# Patient Record
Sex: FEMALE | Race: WHITE | NOT HISPANIC OR LATINO | Employment: OTHER | URBAN - NONMETROPOLITAN AREA
[De-identification: names, ages, dates, MRNs, and addresses within clinical notes are randomized per-mention and may not be internally consistent; named-entity substitution may affect disease eponyms.]

---

## 2017-02-07 ENCOUNTER — HOSPITAL ENCOUNTER (EMERGENCY)
Facility: HOSPITAL | Age: 27
Discharge: HOME | End: 2017-02-07
Admitting: NURSE PRACTITIONER

## 2017-02-07 VITALS
TEMPERATURE: 98 F | WEIGHT: 209.8 LBS | BODY MASS INDEX: 34.95 KG/M2 | SYSTOLIC BLOOD PRESSURE: 153 MMHG | RESPIRATION RATE: 18 BRPM | OXYGEN SATURATION: 98 % | DIASTOLIC BLOOD PRESSURE: 83 MMHG | HEIGHT: 65 IN | HEART RATE: 98 BPM

## 2017-02-07 DIAGNOSIS — L02.91 ABSCESS: Primary | ICD-10-CM

## 2017-02-07 PROCEDURE — 99282 EMERGENCY DEPT VISIT SF MDM: CPT

## 2017-02-07 RX ORDER — SULFAMETHOXAZOLE AND TRIMETHOPRIM 800; 160 MG/1; MG/1
1 TABLET ORAL 2 TIMES DAILY
Qty: 14 TABLET | Refills: 0 | Status: SHIPPED | OUTPATIENT
Start: 2017-02-07 | End: 2017-02-14

## 2017-02-07 RX ORDER — HYDROCODONE BITARTRATE AND ACETAMINOPHEN 5; 325 MG/1; MG/1
1 TABLET ORAL EVERY 6 HOURS PRN
Qty: 12 TABLET | Refills: 0 | Status: SHIPPED | OUTPATIENT
Start: 2017-02-07 | End: 2017-02-10

## 2017-02-08 NOTE — DISCHARGE INSTRUCTIONS
Please follow-up to primary care provider in one to 2 days for recheck.  Return to ER as needed sooner if any new or worsening symptoms. Use warm soaks to the affected area; f/u tomorrow with your PCP for a recheck or return here for recheck if you cannot be seen

## 2017-02-08 NOTE — ED NOTES
"PT HAS 2 \"BUMPS\" ON LEFT BUTTOCK THAT ARE PAINFUL AND SWOLLEN.      Yeimi Green RN  02/07/17 3424    "

## 2017-02-11 NOTE — ED PROVIDER NOTES
Subjective   Patient is a 26 y.o. female presenting with abscess.   History provided by:  Patient   used: No    Abscess   Abscess location: right buttock.  Abscess quality: induration, painful, redness and warmth    Abscess quality: not draining, no fluctuance, no itching and not weeping    Red streaking: no    Duration:  2 days  Progression:  Worsening  Pain details:     Quality:  Pressure    Severity:  Moderate    Duration:  2 days    Timing:  Constant    Progression:  Worsening  Chronicity:  New  Context: not diabetes, not immunosuppression, not insect bite/sting and not skin injury    Relieved by:  Nothing  Worsened by:  Nothing  Ineffective treatments:  None tried  Associated symptoms: no anorexia, no fatigue, no fever, no headaches, no nausea and no vomiting    Risk factors: no family hx of MRSA, no hx of MRSA and no prior abscess        Review of Systems   Constitutional: Negative for fatigue and fever.   Gastrointestinal: Negative for anorexia, nausea and vomiting.   Neurological: Negative for headaches.   All other systems reviewed and are negative.      Past Medical History   Diagnosis Date   • Anxiety    • Bipolar 1 disorder    • Bipolar 1 disorder    • Depression        No Known Allergies    Past Surgical History   Procedure Laterality Date   •  section     • Cholecystectomy         History reviewed. No pertinent family history.    Social History     Social History   • Marital status:      Spouse name: N/A   • Number of children: N/A   • Years of education: N/A     Social History Main Topics   • Smoking status: Current Every Day Smoker     Packs/day: 1.00     Types: Cigarettes   • Smokeless tobacco: None   • Alcohol use No   • Drug use: No   • Sexual activity: Not Asked     Other Topics Concern   • None     Social History Narrative   • None           Objective   Physical Exam   Constitutional: She is oriented to person, place, and time. She appears well-developed and  well-nourished.   HENT:   Head: Normocephalic and atraumatic.   Eyes: Conjunctivae are normal. Pupils are equal, round, and reactive to light.   Cardiovascular: Normal rate, regular rhythm and normal heart sounds.    Pulmonary/Chest: Effort normal and breath sounds normal.   Musculoskeletal: Normal range of motion.   Neurological: She is alert and oriented to person, place, and time.   Skin: Skin is warm and dry.        Nursing note and vitals reviewed.      Procedures         ED Course  ED Course   Comment By Time   At this time, the area is not read to be I&D, there is erythema noted, but now fluctuance or pocket of infection noted on exam. Pt will be placed on ABX at this time; she is advised to use warm compresses and warm soaks to the affected area. She is UTD on her TD vaccine at this time. She is asked to f/u with her PCP in 1-2 days for a recheck. R/t to ER as needed, sooner if any new or worsening symptoms. Radha Villafana, APRN 02/11 1346                  MDM  Number of Diagnoses or Management Options  Abscess: new and requires workup  Patient Progress  Patient progress: stable      Final diagnoses:   Abscess            Radha Villafana, APRN  02/11/17 3476

## 2017-07-28 ENCOUNTER — TRANSCRIBE ORDERS (OUTPATIENT)
Dept: GENERAL RADIOLOGY | Facility: HOSPITAL | Age: 27
End: 2017-07-28

## 2017-07-28 ENCOUNTER — LAB (OUTPATIENT)
Dept: LAB | Facility: HOSPITAL | Age: 27
End: 2017-07-28

## 2017-07-28 DIAGNOSIS — Z79.899 DRUG THERAPY: ICD-10-CM

## 2017-07-28 DIAGNOSIS — Z79.899 DRUG THERAPY: Primary | ICD-10-CM

## 2017-07-28 LAB
ALBUMIN SERPL-MCNC: 3.8 G/DL (ref 3.5–5)
ALBUMIN/GLOB SERPL: 1.1 G/DL (ref 1.1–2.5)
ALP SERPL-CCNC: 52 U/L (ref 24–120)
ALT SERPL W P-5'-P-CCNC: 34 U/L (ref 0–54)
AMPHET+METHAMPHET UR QL: POSITIVE
ANION GAP SERPL CALCULATED.3IONS-SCNC: 8 MMOL/L (ref 4–13)
AST SERPL-CCNC: 23 U/L (ref 7–45)
AUTO MIXED CELLS #: 0.5 10*3/MM3 (ref 0.1–2.6)
AUTO MIXED CELLS %: 8 % (ref 0.1–24)
BARBITURATES UR QL SCN: NEGATIVE
BENZODIAZ UR QL SCN: POSITIVE
BILIRUB SERPL-MCNC: 0.6 MG/DL (ref 0.1–1)
BUN BLD-MCNC: 9 MG/DL (ref 5–21)
BUN/CREAT SERPL: 13.6
CALCIUM SPEC-SCNC: 8.8 MG/DL (ref 8.4–10.4)
CANNABINOIDS SERPL QL: NEGATIVE
CHLORIDE SERPL-SCNC: 104 MMOL/L (ref 98–110)
CO2 SERPL-SCNC: 28 MMOL/L (ref 24–31)
COCAINE UR QL: NEGATIVE
CREAT BLD-MCNC: 0.66 MG/DL (ref 0.5–1.4)
ERYTHROCYTE [DISTWIDTH] IN BLOOD BY AUTOMATED COUNT: 12.8 % (ref 12–15)
GFR SERPL CREATININE-BSD FRML MDRD: 107 ML/MIN/1.73
GLOBULIN UR ELPH-MCNC: 3.6 GM/DL
GLUCOSE BLD-MCNC: 96 MG/DL (ref 70–100)
HCT VFR BLD AUTO: 39.2 % (ref 37–47)
HGB BLD-MCNC: 13.9 G/DL (ref 12–16)
LYMPHOCYTES # BLD AUTO: 3.8 10*3/MM3 (ref 0.8–7)
LYMPHOCYTES NFR BLD AUTO: 56.8 % (ref 15–45)
MCH RBC QN AUTO: 30.3 PG (ref 28–32)
MCHC RBC AUTO-ENTMCNC: 35.5 G/DL (ref 33–36)
MCV RBC AUTO: 85.6 FL (ref 82–98)
METHADONE UR QL SCN: NEGATIVE
NEUTROPHILS # BLD AUTO: 2.4 10*3/MM3 (ref 1.5–8.3)
NEUTROPHILS NFR BLD AUTO: 35.2 % (ref 39–78)
OPIATES UR QL: NEGATIVE
PCP UR QL SCN: NEGATIVE
PLATELET # BLD AUTO: 248 10*3/MM3 (ref 130–400)
PMV BLD AUTO: 8.5 FL (ref 6–12)
POTASSIUM BLD-SCNC: 4.2 MMOL/L (ref 3.5–5.3)
PROT SERPL-MCNC: 7.4 G/DL (ref 6.3–8.7)
RBC # BLD AUTO: 4.58 10*6/MM3 (ref 4.2–5.4)
SODIUM BLD-SCNC: 140 MMOL/L (ref 135–145)
VALPROATE SERPL-MCNC: 26.7 MCG/ML (ref 50–100)
WBC NRBC COR # BLD: 6.7 10*3/MM3 (ref 4.8–10.8)

## 2017-07-28 PROCEDURE — 80307 DRUG TEST PRSMV CHEM ANLYZR: CPT | Performed by: NURSE PRACTITIONER

## 2017-07-28 PROCEDURE — 85025 COMPLETE CBC W/AUTO DIFF WBC: CPT

## 2017-07-28 PROCEDURE — 80053 COMPREHEN METABOLIC PANEL: CPT

## 2017-07-28 PROCEDURE — 80164 ASSAY DIPROPYLACETIC ACD TOT: CPT | Performed by: NURSE PRACTITIONER

## 2017-07-28 PROCEDURE — 36415 COLL VENOUS BLD VENIPUNCTURE: CPT

## 2017-08-14 ENCOUNTER — APPOINTMENT (OUTPATIENT)
Dept: GENERAL RADIOLOGY | Facility: HOSPITAL | Age: 27
End: 2017-08-14

## 2017-08-14 ENCOUNTER — HOSPITAL ENCOUNTER (EMERGENCY)
Facility: HOSPITAL | Age: 27
Discharge: HOME | End: 2017-08-14
Attending: EMERGENCY MEDICINE | Admitting: EMERGENCY MEDICINE

## 2017-08-14 VITALS
HEART RATE: 99 BPM | WEIGHT: 180 LBS | SYSTOLIC BLOOD PRESSURE: 129 MMHG | BODY MASS INDEX: 29.99 KG/M2 | TEMPERATURE: 98.4 F | RESPIRATION RATE: 14 BRPM | DIASTOLIC BLOOD PRESSURE: 67 MMHG | OXYGEN SATURATION: 100 % | HEIGHT: 65 IN

## 2017-08-14 DIAGNOSIS — R07.81 PLEURITIC CHEST PAIN: Primary | ICD-10-CM

## 2017-08-14 LAB
ALBUMIN SERPL-MCNC: 4.5 G/DL (ref 3.5–5)
ALBUMIN/GLOB SERPL: 1.3 G/DL (ref 1.1–2.5)
ALP SERPL-CCNC: 63 U/L (ref 24–120)
ALT SERPL W P-5'-P-CCNC: 42 U/L (ref 0–54)
ANION GAP SERPL CALCULATED.3IONS-SCNC: 8 MMOL/L (ref 4–13)
AST SERPL-CCNC: 39 U/L (ref 7–45)
B-HCG UR QL: NEGATIVE
BACTERIA UR QL AUTO: ABNORMAL /HPF
BASOPHILS # BLD AUTO: 0.01 10*3/MM3 (ref 0–0.2)
BASOPHILS NFR BLD AUTO: 0.1 % (ref 0–2)
BILIRUB SERPL-MCNC: 0.5 MG/DL (ref 0.1–1)
BILIRUB UR QL STRIP: NEGATIVE
BUN BLD-MCNC: 11 MG/DL (ref 5–21)
BUN/CREAT SERPL: 17.2 (ref 7–25)
CALCIUM SPEC-SCNC: 9.2 MG/DL (ref 8.4–10.4)
CHLORIDE SERPL-SCNC: 103 MMOL/L (ref 98–110)
CLARITY UR: CLEAR
CO2 SERPL-SCNC: 26 MMOL/L (ref 24–31)
COLOR UR: YELLOW
CREAT BLD-MCNC: 0.64 MG/DL (ref 0.5–1.4)
D DIMER PPP FEU-MCNC: 0.69 MG/L (FEU) (ref 0–0.5)
DEPRECATED RDW RBC AUTO: 41.9 FL (ref 40–54)
EOSINOPHIL # BLD AUTO: 0.04 10*3/MM3 (ref 0–0.7)
EOSINOPHIL NFR BLD AUTO: 0.5 % (ref 0–4)
ERYTHROCYTE [DISTWIDTH] IN BLOOD BY AUTOMATED COUNT: 13.1 % (ref 12–15)
GFR SERPL CREATININE-BSD FRML MDRD: 111 ML/MIN/1.73
GLOBULIN UR ELPH-MCNC: 3.5 GM/DL
GLUCOSE BLD-MCNC: 87 MG/DL (ref 70–100)
GLUCOSE UR STRIP-MCNC: NEGATIVE MG/DL
HCT VFR BLD AUTO: 42.5 % (ref 37–47)
HGB BLD-MCNC: 14.8 G/DL (ref 12–16)
HGB UR QL STRIP.AUTO: NEGATIVE
HYALINE CASTS UR QL AUTO: ABNORMAL /LPF
IMM GRANULOCYTES # BLD: 0.01 10*3/MM3 (ref 0–0.03)
IMM GRANULOCYTES NFR BLD: 0.1 % (ref 0–5)
KETONES UR QL STRIP: NEGATIVE
LEUKOCYTE ESTERASE UR QL STRIP.AUTO: ABNORMAL
LYMPHOCYTES # BLD AUTO: 1.87 10*3/MM3 (ref 0.72–4.86)
LYMPHOCYTES NFR BLD AUTO: 23.8 % (ref 15–45)
MCH RBC QN AUTO: 30.4 PG (ref 28–32)
MCHC RBC AUTO-ENTMCNC: 34.8 G/DL (ref 33–36)
MCV RBC AUTO: 87.3 FL (ref 82–98)
MONOCYTES # BLD AUTO: 0.73 10*3/MM3 (ref 0.19–1.3)
MONOCYTES NFR BLD AUTO: 9.3 % (ref 4–12)
NEUTROPHILS # BLD AUTO: 5.2 10*3/MM3 (ref 1.87–8.4)
NEUTROPHILS NFR BLD AUTO: 66.2 % (ref 39–78)
NITRITE UR QL STRIP: NEGATIVE
PH UR STRIP.AUTO: 6.5 [PH] (ref 5–8)
PLATELET # BLD AUTO: 191 10*3/MM3 (ref 130–400)
PMV BLD AUTO: 10.6 FL (ref 6–12)
POTASSIUM BLD-SCNC: 4.3 MMOL/L (ref 3.5–5.3)
PROT SERPL-MCNC: 8 G/DL (ref 6.3–8.7)
PROT UR QL STRIP: NEGATIVE
RBC # BLD AUTO: 4.87 10*6/MM3 (ref 4.2–5.4)
RBC # UR: ABNORMAL /HPF
REF LAB TEST METHOD: ABNORMAL
SODIUM BLD-SCNC: 137 MMOL/L (ref 135–145)
SP GR UR STRIP: 1.02 (ref 1–1.03)
SQUAMOUS #/AREA URNS HPF: ABNORMAL /HPF
UROBILINOGEN UR QL STRIP: ABNORMAL
WBC NRBC COR # BLD: 7.86 10*3/MM3 (ref 4.8–10.8)
WBC UR QL AUTO: ABNORMAL /HPF

## 2017-08-14 PROCEDURE — 85025 COMPLETE CBC W/AUTO DIFF WBC: CPT | Performed by: EMERGENCY MEDICINE

## 2017-08-14 PROCEDURE — 81001 URINALYSIS AUTO W/SCOPE: CPT | Performed by: EMERGENCY MEDICINE

## 2017-08-14 PROCEDURE — 71020 HC CHEST PA AND LATERAL: CPT

## 2017-08-14 PROCEDURE — 99283 EMERGENCY DEPT VISIT LOW MDM: CPT

## 2017-08-14 PROCEDURE — 96372 THER/PROPH/DIAG INJ SC/IM: CPT

## 2017-08-14 PROCEDURE — 81025 URINE PREGNANCY TEST: CPT | Performed by: EMERGENCY MEDICINE

## 2017-08-14 PROCEDURE — 25010000002 KETOROLAC TROMETHAMINE PER 15 MG: Performed by: EMERGENCY MEDICINE

## 2017-08-14 PROCEDURE — 85379 FIBRIN DEGRADATION QUANT: CPT | Performed by: EMERGENCY MEDICINE

## 2017-08-14 PROCEDURE — 80053 COMPREHEN METABOLIC PANEL: CPT | Performed by: EMERGENCY MEDICINE

## 2017-08-14 RX ORDER — KETOROLAC TROMETHAMINE 10 MG/1
10 TABLET, FILM COATED ORAL EVERY 6 HOURS PRN
Qty: 12 TABLET | Refills: 0 | Status: SHIPPED | OUTPATIENT
Start: 2017-08-14 | End: 2022-09-08

## 2017-08-14 RX ORDER — KETOROLAC TROMETHAMINE 30 MG/ML
30 INJECTION, SOLUTION INTRAMUSCULAR; INTRAVENOUS ONCE
Status: DISCONTINUED | OUTPATIENT
Start: 2017-08-14 | End: 2017-08-14

## 2017-08-14 RX ORDER — GABAPENTIN 800 MG/1
800 TABLET ORAL 3 TIMES DAILY
COMMUNITY
End: 2022-09-08

## 2017-08-14 RX ORDER — CYCLOBENZAPRINE HCL 10 MG
10 TABLET ORAL ONCE
Status: COMPLETED | OUTPATIENT
Start: 2017-08-14 | End: 2017-08-14

## 2017-08-14 RX ORDER — KETOROLAC TROMETHAMINE 30 MG/ML
30 INJECTION, SOLUTION INTRAMUSCULAR; INTRAVENOUS ONCE
Status: COMPLETED | OUTPATIENT
Start: 2017-08-14 | End: 2017-08-14

## 2017-08-14 RX ORDER — CYCLOBENZAPRINE HCL 10 MG
10 TABLET ORAL 3 TIMES DAILY PRN
Qty: 20 TABLET | Refills: 0 | Status: SHIPPED | OUTPATIENT
Start: 2017-08-14 | End: 2022-09-08

## 2017-08-14 RX ADMIN — KETOROLAC TROMETHAMINE 30 MG: 30 INJECTION, SOLUTION INTRAMUSCULAR at 21:22

## 2017-08-14 RX ADMIN — CYCLOBENZAPRINE HYDROCHLORIDE 10 MG: 10 TABLET, FILM COATED ORAL at 21:21

## 2017-08-15 NOTE — ED NOTES
Patient reports that for the past 2 days she has had abdominal discomfort and cannot eat anything because it feels so full and feels like the food is lodged in her chest. Patient has been taking gas x which has made her belch but causes pains in her chest when she does belch     Yeimi Green RN  08/14/17 2042

## 2017-08-15 NOTE — ED PROVIDER NOTES
Subjective   HPI Comments: Pt c/o a dull ache in her chest.  States that it began 2 days ago.  Pain worse with movement and deep inspiration.  She has not had a cough with the discomfort.  Pt reports a fever but has not taken the temp.  She states that she took tylenol PTA and has been sweating since.  She reports feeling like there is something stuck in her epigastric area that is causing the discomfort.        History provided by:  Patient      Review of Systems   Constitutional: Negative for activity change, fatigue and fever.   HENT: Negative for congestion, ear pain, facial swelling, postnasal drip, rhinorrhea, sinus pressure, sore throat and trouble swallowing.    Eyes: Negative for photophobia and redness.   Respiratory: Positive for chest tightness. Negative for shortness of breath and wheezing.    Cardiovascular: Positive for chest pain. Negative for palpitations and leg swelling.   Gastrointestinal: Positive for abdominal pain. Negative for abdominal distention, diarrhea, nausea and vomiting.   Genitourinary: Negative for difficulty urinating, dysuria and flank pain.   Musculoskeletal: Negative for back pain and neck pain.   Skin: Negative for color change and wound.   Neurological: Negative for dizziness, speech difficulty, weakness, light-headedness and headaches.   Psychiatric/Behavioral: Negative for agitation, confusion, self-injury and suicidal ideas.       Past Medical History:   Diagnosis Date   • Anxiety    • Bipolar 1 disorder    • Bipolar 1 disorder    • Depression        No Known Allergies    Past Surgical History:   Procedure Laterality Date   •  SECTION     • CHOLECYSTECTOMY         History reviewed. No pertinent family history.    Social History     Social History   • Marital status:      Spouse name: N/A   • Number of children: N/A   • Years of education: N/A     Social History Main Topics   • Smoking status: Current Every Day Smoker     Packs/day: 1.00     Types: Cigarettes    • Smokeless tobacco: None   • Alcohol use No   • Drug use: No   • Sexual activity: Not Asked     Other Topics Concern   • None     Social History Narrative           Objective   Physical Exam   Constitutional: She is oriented to person, place, and time. She appears well-developed and well-nourished. No distress.   HENT:   Head: Normocephalic and atraumatic.   Mouth/Throat: Oropharynx is clear and moist. No oropharyngeal exudate.   Eyes: EOM are normal. Pupils are equal, round, and reactive to light.   Neck: Normal range of motion. Neck supple. No JVD present.   Cardiovascular: Normal rate, regular rhythm and normal heart sounds.  Exam reveals no friction rub.    No murmur heard.  Pulmonary/Chest: Effort normal and breath sounds normal. No respiratory distress. She has no wheezes. She has no rales.   Abdominal: Soft. Bowel sounds are normal. She exhibits no distension. There is no tenderness. There is no rebound and no guarding.   Neurological: She is alert and oriented to person, place, and time. No cranial nerve deficit.   Skin: Skin is warm and dry. No rash noted.   Psychiatric: She has a normal mood and affect. Her behavior is normal. Judgment and thought content normal.   Nursing note and vitals reviewed.      Procedures         ED Course  ED Course      Lab Results (last 24 hours)     Procedure Component Value Units Date/Time    Urinalysis With / Culture If Indicated [[id number]]  (Abnormal) Collected:  08/14/17 2107    Specimen:  Urine from Urine, Clean Catch Updated:  08/14/17 2119     Color, UA Yellow     Appearance, UA Clear     pH, UA 6.5     Specific Gravity, UA 1.025     Glucose, UA Negative     Ketones, UA Negative     Bilirubin, UA Negative     Blood, UA Negative     Protein, UA Negative     Leuk Esterase, UA Trace (A)     Nitrite, UA Negative     Urobilinogen, UA 0.2 E.U./dL    Pregnancy, Urine [[id number]]  (Normal) Collected:  08/14/17 2107    Specimen:  Urine from Urine, Clean Catch Updated:   08/14/17 2120     HCG, Urine QL Negative    Urinalysis, Microscopic Only [[id number]]  (Abnormal) Collected:  08/14/17 2107    Specimen:  Urine from Urine, Clean Catch Updated:  08/14/17 2119     RBC, UA 3-5 (A) /HPF      WBC, UA 3-5 (A) /HPF      Bacteria, UA None Seen /HPF      Squamous Epithelial Cells, UA 3-6 (A) /HPF      Hyaline Casts, UA 0-2 /LPF      Methodology Automated Microscopy    CBC & Differential [[id number]] Collected:  08/14/17 2140    Specimen:  Blood Updated:  08/14/17 2149    Narrative:       The following orders were created for panel order CBC & Differential.  Procedure                               Abnormality         Status                     ---------                               -----------         ------                     CBC Auto Differential[[id number]]        Normal              Final result                 Please view results for these tests on the individual orders.    Comprehensive Metabolic Panel [[id number]] Collected:  08/14/17 2140    Specimen:  Blood Updated:  08/14/17 2158     Glucose 87 mg/dL      BUN 11 mg/dL      Creatinine 0.64 mg/dL      Sodium 137 mmol/L      Potassium 4.3 mmol/L      Chloride 103 mmol/L      CO2 26.0 mmol/L      Calcium 9.2 mg/dL      Total Protein 8.0 g/dL      Albumin 4.50 g/dL      ALT (SGPT) 42 U/L      AST (SGOT) 39 U/L      Alkaline Phosphatase 63 U/L      Total Bilirubin 0.5 mg/dL      eGFR Non African Amer 111 mL/min/1.73      Globulin 3.5 gm/dL      A/G Ratio 1.3 g/dL      BUN/Creatinine Ratio 17.2     Anion Gap 8.0 mmol/L     D-dimer, Quantitative [[id number]]  (Abnormal) Collected:  08/14/17 2140    Specimen:  Blood Updated:  08/14/17 2200     D-Dimer, Quantitative 0.69 (H) mg/L (FEU)     Narrative:       Reference Range is 0-0.50 mg/L FEU. However, results <0.50 mg/L FEU tends to rule out DVT or PE. Results >0.50 mg/L FEU are not useful in predicting absence or presence of DVT or PE.    CBC Auto Differential [[id number]]  (Normal) Collected:   08/14/17 2140    Specimen:  Blood Updated:  08/14/17 2149     WBC 7.86 10*3/mm3      RBC 4.87 10*6/mm3      Hemoglobin 14.8 g/dL      Hematocrit 42.5 %      MCV 87.3 fL      MCH 30.4 pg      MCHC 34.8 g/dL      RDW 13.1 %      RDW-SD 41.9 fl      MPV 10.6 fL      Platelets 191 10*3/mm3      Neutrophil % 66.2 %      Lymphocyte % 23.8 %      Monocyte % 9.3 %      Eosinophil % 0.5 %      Basophil % 0.1 %      Immature Grans % 0.1 %      Neutrophils, Absolute 5.20 10*3/mm3      Lymphocytes, Absolute 1.87 10*3/mm3      Monocytes, Absolute 0.73 10*3/mm3      Eosinophils, Absolute 0.04 10*3/mm3      Basophils, Absolute 0.01 10*3/mm3      Immature Grans, Absolute 0.01 10*3/mm3                       MDM  Number of Diagnoses or Management Options  Pleuritic chest pain: new and requires workup  Diagnosis management comments: Pt denied cough to me but the nurse reports that pt has been coughing green sputum.  Her w/u is essentially normal.  Pt was told that she could not go out to smoke yet pt walked out stating that she needed to call her .  I suspect that pt has bronchitis after the nurse and I discussed the case, but pt was not in the room when I went to d/c her.  The dimer was up 0.1 above normal and I do not suspect that she has a PE.  Her Well's criteria score was a 0.  Will place her on NSAIDs and muscle relaxer.  F/u with PCP.  Pt instructed to return to the ED if they has any further issues or new complaints.          Amount and/or Complexity of Data Reviewed  Clinical lab tests: ordered and reviewed  Tests in the radiology section of CPT®: ordered and reviewed  Tests in the medicine section of CPT®: ordered and reviewed  Independent visualization of images, tracings, or specimens: yes    Risk of Complications, Morbidity, and/or Mortality  Presenting problems: moderate  Diagnostic procedures: moderate  Management options: moderate    Patient Progress  Patient progress: stable      Final diagnoses:   Pleuritic  chest pain            Odin Briones MD  08/14/17 5352

## 2017-08-18 NOTE — PROGRESS NOTES
Continued Stay Note   Jacob     Patient Name: Emmie Noonan  MRN: [id number]  Today's Date: 8/18/2017    Admit Date: 8/14/2017          Discharge Plan       08/18/17 1429    Case Management/Social Work Plan    Additional Comments Pt needs established MD. Appt made at [facility] on [address] on Aug 30th @ 3pm.  Left message on pt's  [phone].               Discharge Codes     None            JAVY Parks

## 2017-08-18 NOTE — ED NOTES
"ED Call Back Questions    1. How are you doing since leaving the Emergency Department?    Feel the same.  2. Do you have any questions about your discharge instructions? No     3. Have you filled your new prescriptions yet? Yes   a. Do you have any questions about those medications? No     4. Were you able to make a follow-up appointment with the physician? No     5. Do you have a primary care physician? No   a. If No, would you like for me to set you up with one? Yes   i. If Yes, “I will have our ED  give you a call right back at this number to work with you on the best time for an appointment.”    6. We are always looking to get better at what we do. Do you have any suggestions for what we can do to be even better? No   a. If Yes, \"Thank you for sharing your concerns. I apologize. I will follow up with our manager and patient . Would you like someone to call you back?\" Yes     7. Is there anything else I can do for you? No   Visit was ok,just took forever to get called back     Glen Geller  08/18/17 1020    "

## 2017-11-30 ENCOUNTER — TRANSCRIBE ORDERS (OUTPATIENT)
Dept: ADMINISTRATIVE | Facility: HOSPITAL | Age: 27
End: 2017-11-30

## 2017-11-30 DIAGNOSIS — Z12.31 ENCOUNTER FOR SCREENING MAMMOGRAM FOR MALIGNANT NEOPLASM OF BREAST: Primary | ICD-10-CM

## 2017-12-01 ENCOUNTER — HOSPITAL ENCOUNTER (OUTPATIENT)
Dept: ULTRASOUND IMAGING | Facility: HOSPITAL | Age: 27
Discharge: HOME | End: 2017-12-01
Attending: OBSTETRICS & GYNECOLOGY | Admitting: OBSTETRICS & GYNECOLOGY

## 2017-12-01 ENCOUNTER — APPOINTMENT (OUTPATIENT)
Dept: ULTRASOUND IMAGING | Facility: HOSPITAL | Age: 27
End: 2017-12-01
Attending: OBSTETRICS & GYNECOLOGY

## 2017-12-01 DIAGNOSIS — Z12.31 ENCOUNTER FOR SCREENING MAMMOGRAM FOR MALIGNANT NEOPLASM OF BREAST: ICD-10-CM

## 2017-12-01 PROCEDURE — 76642 ULTRASOUND BREAST LIMITED: CPT

## 2018-01-21 ENCOUNTER — HOSPITAL ENCOUNTER (EMERGENCY)
Facility: HOSPITAL | Age: 28
Discharge: HOME | End: 2018-01-21
Admitting: FAMILY MEDICINE

## 2018-01-21 VITALS
WEIGHT: 220 LBS | HEIGHT: 65 IN | BODY MASS INDEX: 36.65 KG/M2 | TEMPERATURE: 98.4 F | SYSTOLIC BLOOD PRESSURE: 157 MMHG | DIASTOLIC BLOOD PRESSURE: 86 MMHG | HEART RATE: 106 BPM | OXYGEN SATURATION: 99 % | RESPIRATION RATE: 17 BRPM

## 2018-01-21 DIAGNOSIS — J02.8 PHARYNGITIS DUE TO OTHER ORGANISM: Primary | ICD-10-CM

## 2018-01-21 DIAGNOSIS — R05.9 COUGH IN ADULT: ICD-10-CM

## 2018-01-21 LAB
FLUAV AG NPH QL: NEGATIVE
FLUBV AG NPH QL IA: NEGATIVE

## 2018-01-21 PROCEDURE — 87804 INFLUENZA ASSAY W/OPTIC: CPT | Performed by: NURSE PRACTITIONER

## 2018-01-21 PROCEDURE — 99283 EMERGENCY DEPT VISIT LOW MDM: CPT

## 2018-01-21 RX ORDER — DEXTROMETHORPHAN HYDROBROMIDE AND PROMETHAZINE HYDROCHLORIDE 15; 6.25 MG/5ML; MG/5ML
5 SYRUP ORAL 4 TIMES DAILY PRN
Qty: 118 ML | Refills: 0 | Status: SHIPPED | OUTPATIENT
Start: 2018-01-21 | End: 2018-01-24

## 2018-01-21 RX ORDER — CEFUROXIME AXETIL 500 MG/1
500 TABLET ORAL
Qty: 20 TABLET | Refills: 0 | Status: SHIPPED | OUTPATIENT
Start: 2018-01-21 | End: 2018-01-31

## 2018-01-21 RX ORDER — METHYLPREDNISOLONE 4 MG/1
TABLET ORAL
Qty: 1 TABLET | Refills: 0 | Status: SHIPPED | OUTPATIENT
Start: 2018-01-21 | End: 2022-09-08

## 2018-01-22 NOTE — DISCHARGE INSTRUCTIONS
Follow with PCP as needed or if symptoms do not improve.   Take medications as prescribed.   Return to ER with worsening symptoms.

## 2018-01-22 NOTE — ED PROVIDER NOTES
Subjective   Patient is a 27 y.o. female presenting with fever.   History provided by:  Patient   used: No    Fever   Max temp prior to arrival:  Unknown  Temp source:  Subjective  Onset quality:  Gradual  Duration:  3 days  Progression:  Worsening  Chronicity:  New  Relieved by:  None tried  Worsened by:  Nothing  Ineffective treatments:  None tried  Associated symptoms: chills, congestion, cough, rhinorrhea, sore throat and vomiting    Associated symptoms: no chest pain, no confusion, no diarrhea, no dysuria, no ear pain, no headaches, no myalgias, no nausea, no rash and no somnolence      27-year-old female presents to the emergency room with complaints of fever, sore throat, fatigue, and diffuse sweating.  Patient states that this started approximately 3-4 days ago and she believes that she has had a fever but she does not have a thermometer at home.  She states that she will get lightheaded and dizzy and have to sit down.  She states that she will break out in a cold sweat and sweat profusely. Patient also states that she has been coughing quite a bit.   Patient denies nausea or vomiting.  Review of Systems   Constitutional: Positive for chills, diaphoresis and fever.   HENT: Positive for congestion, rhinorrhea and sore throat. Negative for ear pain.    Respiratory: Positive for cough.    Cardiovascular: Negative for chest pain.   Gastrointestinal: Positive for vomiting. Negative for diarrhea and nausea.   Genitourinary: Negative for dysuria.   Musculoskeletal: Negative for myalgias.   Skin: Negative for rash.   Neurological: Negative for headaches.   Psychiatric/Behavioral: Negative for confusion.       Past Medical History:   Diagnosis Date   • Anxiety    • Bipolar 1 disorder    • Bipolar 1 disorder    • Depression        No Known Allergies    Past Surgical History:   Procedure Laterality Date   •  SECTION     • CHOLECYSTECTOMY         History reviewed. No pertinent family  history.    Social History     Social History   • Marital status:      Spouse name: N/A   • Number of children: N/A   • Years of education: N/A     Social History Main Topics   • Smoking status: Current Every Day Smoker     Packs/day: 1.00     Types: Cigarettes   • Smokeless tobacco: None   • Alcohol use No   • Drug use: No   • Sexual activity: Not Asked     Other Topics Concern   • None     Social History Narrative           Objective   Physical Exam   Constitutional: She is oriented to person, place, and time. She appears well-developed and well-nourished. No distress.   HENT:   Right Ear: Tympanic membrane is erythematous. A middle ear effusion is present.   Left Ear: Tympanic membrane is erythematous. A middle ear effusion is present.   Nose: Rhinorrhea present.   Mouth/Throat: Posterior oropharyngeal edema and posterior oropharyngeal erythema present. Tonsils are 3+ on the right. Tonsils are 3+ on the left.   Eyes: EOM are normal. Pupils are equal, round, and reactive to light. Right eye exhibits no discharge. Left eye exhibits no discharge.   Neck: Normal range of motion. Neck supple.   Cardiovascular: Regular rhythm and normal heart sounds.  Tachycardia present.    Pulmonary/Chest: Effort normal and breath sounds normal. No respiratory distress. She has no wheezes. She has no rales. She exhibits no tenderness.   Musculoskeletal: Normal range of motion. She exhibits no edema, tenderness or deformity.   Neurological: She is oriented to person, place, and time.   Skin: No rash noted. She is diaphoretic. No erythema. There is pallor.   Psychiatric: She has a normal mood and affect. Her behavior is normal. Judgment and thought content normal.   Nursing note and vitals reviewed.      Procedures         ED Course  ED Course      Discussed negative flu result with patient. Will discharge patient home with dx of pharyngitis. Discharge in stable condition. Return to ER with worsening symptoms.  Patient  verbalizes understanding.           MDM  Number of Diagnoses or Management Options  Cough in adult: new and requires workup  Pharyngitis due to other organism: new and requires workup     Amount and/or Complexity of Data Reviewed  Clinical lab tests: reviewed  Discuss the patient with other providers: yes    Risk of Complications, Morbidity, and/or Mortality  Presenting problems: low  Diagnostic procedures: low  Management options: low    Patient Progress  Patient progress: stable      Final diagnoses:   Pharyngitis due to other organism   Cough in adult            Brenda Hernandez, APRN  01/21/18 1926

## 2018-01-22 NOTE — ED NOTES
C/o shaking, eyes go black when stand up, fever subjective, sweaty, aching all over  For 3-4 days.     Kathia Rees RN  01/21/18 3914

## 2018-07-27 ENCOUNTER — LAB (OUTPATIENT)
Dept: LAB | Facility: HOSPITAL | Age: 28
End: 2018-07-27

## 2018-07-27 ENCOUNTER — TRANSCRIBE ORDERS (OUTPATIENT)
Dept: ADMINISTRATIVE | Facility: HOSPITAL | Age: 28
End: 2018-07-27

## 2018-07-27 DIAGNOSIS — Z79.899 ENCOUNTER FOR LONG-TERM (CURRENT) USE OF MEDICATIONS: Primary | ICD-10-CM

## 2018-07-27 DIAGNOSIS — Z79.899 ENCOUNTER FOR LONG-TERM (CURRENT) USE OF MEDICATIONS: ICD-10-CM

## 2018-07-27 LAB
BILIRUB UR QL STRIP: NEGATIVE
CLARITY UR: CLEAR
COLOR UR: YELLOW
GLUCOSE UR STRIP-MCNC: NEGATIVE MG/DL
HGB UR QL STRIP.AUTO: NEGATIVE
KETONES UR QL STRIP: NEGATIVE
LEUKOCYTE ESTERASE UR QL STRIP.AUTO: NEGATIVE
NITRITE UR QL STRIP: NEGATIVE
PH UR STRIP.AUTO: 6 [PH] (ref 5–8)
PROT UR QL STRIP: NEGATIVE
SP GR UR STRIP: 1.02 (ref 1–1.03)
UROBILINOGEN UR QL STRIP: NORMAL

## 2018-07-27 PROCEDURE — 81003 URINALYSIS AUTO W/O SCOPE: CPT

## 2018-09-24 ENCOUNTER — APPOINTMENT (OUTPATIENT)
Dept: LAB | Facility: HOSPITAL | Age: 28
End: 2018-09-24

## 2018-09-24 ENCOUNTER — TRANSCRIBE ORDERS (OUTPATIENT)
Dept: ADMINISTRATIVE | Facility: HOSPITAL | Age: 28
End: 2018-09-24

## 2018-09-24 DIAGNOSIS — Z79.899 LONG TERM USE OF DRUG: Primary | ICD-10-CM

## 2018-09-24 LAB
AMPHET+METHAMPHET UR QL: NEGATIVE
BARBITURATES UR QL SCN: NEGATIVE
BENZODIAZ UR QL SCN: POSITIVE
CANNABINOIDS SERPL QL: NEGATIVE
COCAINE UR QL: NEGATIVE
METHADONE UR QL SCN: NEGATIVE
OPIATES UR QL: NEGATIVE
PCP UR QL SCN: NEGATIVE

## 2018-09-24 PROCEDURE — 80307 DRUG TEST PRSMV CHEM ANLYZR: CPT | Performed by: NURSE PRACTITIONER

## 2018-09-24 PROCEDURE — G0480 DRUG TEST DEF 1-7 CLASSES: HCPCS | Performed by: NURSE PRACTITIONER

## 2018-09-28 LAB — BENZODIAZ UR QL: NEGATIVE

## 2019-01-10 ENCOUNTER — NURSE TRIAGE (OUTPATIENT)
Dept: CALL CENTER | Facility: HOSPITAL | Age: 29
End: 2019-01-10

## 2019-01-10 NOTE — TELEPHONE ENCOUNTER
Reason for Disposition  • No answer.  First attempt to contact caller.  Follow-up call scheduled within 15 minutes.    Additional Information  • Negative: Caller is angry or rude (e.g., hangs up, verbally abusive, yelling)  • Negative: Caller hangs up  • Negative: Caller has already spoken with the PCP and has no further questions.  • Negative: Caller has already spoken with another triager and has no further questions.  • Negative: Caller has already spoken with another triager or PCP AND has further questions AND triager able to answer questions.  • Negative: Busy signal.  First attempt to contact caller.  Follow-up call scheduled within 15 minutes.    Protocols used: NO CONTACT OR DUPLICATE CONTACT CALL-ADULT-

## 2022-09-08 ENCOUNTER — HOSPITAL ENCOUNTER (EMERGENCY)
Facility: HOSPITAL | Age: 32
Discharge: LEFT BEFORE DISCHARGE | End: 2022-09-09
Attending: STUDENT IN AN ORGANIZED HEALTH CARE EDUCATION/TRAINING PROGRAM | Admitting: STUDENT IN AN ORGANIZED HEALTH CARE EDUCATION/TRAINING PROGRAM

## 2022-09-08 DIAGNOSIS — R10.13 ABDOMINAL PAIN, ACUTE, EPIGASTRIC: ICD-10-CM

## 2022-09-08 DIAGNOSIS — F19.10 POLYSUBSTANCE ABUSE: ICD-10-CM

## 2022-09-08 DIAGNOSIS — Z34.93 THIRD TRIMESTER PREGNANCY: ICD-10-CM

## 2022-09-08 DIAGNOSIS — R06.02 SHORTNESS OF BREATH: Primary | ICD-10-CM

## 2022-09-08 PROBLEM — Z34.90 PREGNANCY: Status: ACTIVE | Noted: 2022-09-08

## 2022-09-08 LAB
ALBUMIN SERPL-MCNC: 3.3 G/DL (ref 3.5–5.2)
ALBUMIN/GLOB SERPL: 0.9 G/DL
ALP SERPL-CCNC: 99 U/L (ref 39–117)
ALT SERPL W P-5'-P-CCNC: 69 U/L (ref 1–33)
AMPHET+METHAMPHET UR QL: POSITIVE
AMPHETAMINES UR QL: POSITIVE
ANION GAP SERPL CALCULATED.3IONS-SCNC: 10 MMOL/L (ref 5–15)
AST SERPL-CCNC: 40 U/L (ref 1–32)
BACTERIA UR QL AUTO: ABNORMAL /HPF
BARBITURATES UR QL SCN: NEGATIVE
BASOPHILS # BLD AUTO: 0.02 10*3/MM3 (ref 0–0.2)
BASOPHILS NFR BLD AUTO: 0.2 % (ref 0–1.5)
BENZODIAZ UR QL SCN: NEGATIVE
BILIRUB SERPL-MCNC: 0.3 MG/DL (ref 0–1.2)
BILIRUB UR QL STRIP: ABNORMAL
BUN SERPL-MCNC: 8 MG/DL (ref 6–20)
BUN/CREAT SERPL: 14.5 (ref 7–25)
BUPRENORPHINE SERPL-MCNC: NEGATIVE NG/ML
CALCIUM SPEC-SCNC: 8.7 MG/DL (ref 8.6–10.5)
CANNABINOIDS SERPL QL: POSITIVE
CHLORIDE SERPL-SCNC: 105 MMOL/L (ref 98–107)
CLARITY UR: ABNORMAL
CO2 SERPL-SCNC: 23 MMOL/L (ref 22–29)
COCAINE UR QL: NEGATIVE
COD CRY URNS QL: ABNORMAL /HPF
COLOR UR: ABNORMAL
CREAT SERPL-MCNC: 0.55 MG/DL (ref 0.57–1)
DEPRECATED RDW RBC AUTO: 42.9 FL (ref 37–54)
EGFRCR SERPLBLD CKD-EPI 2021: 125.1 ML/MIN/1.73
EOSINOPHIL # BLD AUTO: 0.03 10*3/MM3 (ref 0–0.4)
EOSINOPHIL NFR BLD AUTO: 0.2 % (ref 0.3–6.2)
ERYTHROCYTE [DISTWIDTH] IN BLOOD BY AUTOMATED COUNT: 13.3 % (ref 12.3–15.4)
GLOBULIN UR ELPH-MCNC: 3.5 GM/DL
GLUCOSE SERPL-MCNC: 130 MG/DL (ref 65–99)
GLUCOSE UR STRIP-MCNC: NEGATIVE MG/DL
HCT VFR BLD AUTO: 37.2 % (ref 34–46.6)
HGB BLD-MCNC: 12.8 G/DL (ref 12–15.9)
HGB UR QL STRIP.AUTO: NEGATIVE
HYALINE CASTS UR QL AUTO: ABNORMAL /LPF
IMM GRANULOCYTES # BLD AUTO: 0.05 10*3/MM3 (ref 0–0.05)
IMM GRANULOCYTES NFR BLD AUTO: 0.4 % (ref 0–0.5)
KETONES UR QL STRIP: ABNORMAL
LEUKOCYTE ESTERASE UR QL STRIP.AUTO: ABNORMAL
LIPASE SERPL-CCNC: 24 U/L (ref 13–60)
LYMPHOCYTES # BLD AUTO: 2.46 10*3/MM3 (ref 0.7–3.1)
LYMPHOCYTES NFR BLD AUTO: 19.7 % (ref 19.6–45.3)
MCH RBC QN AUTO: 30.5 PG (ref 26.6–33)
MCHC RBC AUTO-ENTMCNC: 34.4 G/DL (ref 31.5–35.7)
MCV RBC AUTO: 88.8 FL (ref 79–97)
METHADONE UR QL SCN: NEGATIVE
MONOCYTES # BLD AUTO: 0.72 10*3/MM3 (ref 0.1–0.9)
MONOCYTES NFR BLD AUTO: 5.8 % (ref 5–12)
NEUTROPHILS NFR BLD AUTO: 73.7 % (ref 42.7–76)
NEUTROPHILS NFR BLD AUTO: 9.19 10*3/MM3 (ref 1.7–7)
NITRITE UR QL STRIP: POSITIVE
NRBC BLD AUTO-RTO: 0 /100 WBC (ref 0–0.2)
OPIATES UR QL: NEGATIVE
OXYCODONE UR QL SCN: NEGATIVE
PCP UR QL SCN: NEGATIVE
PH UR STRIP.AUTO: 6 [PH] (ref 5–8)
PLATELET # BLD AUTO: 307 10*3/MM3 (ref 140–450)
PMV BLD AUTO: 8.9 FL (ref 6–12)
POTASSIUM SERPL-SCNC: 3.6 MMOL/L (ref 3.5–5.2)
PROPOXYPH UR QL: NEGATIVE
PROT SERPL-MCNC: 6.8 G/DL (ref 6–8.5)
PROT UR QL STRIP: ABNORMAL
RBC # BLD AUTO: 4.19 10*6/MM3 (ref 3.77–5.28)
RBC # UR STRIP: ABNORMAL /HPF
REF LAB TEST METHOD: ABNORMAL
SODIUM SERPL-SCNC: 138 MMOL/L (ref 136–145)
SP GR UR STRIP: >1.03 (ref 1–1.03)
SQUAMOUS #/AREA URNS HPF: ABNORMAL /HPF
TRICYCLICS UR QL SCN: NEGATIVE
UROBILINOGEN UR QL STRIP: ABNORMAL
WBC # UR STRIP: ABNORMAL /HPF
WBC NRBC COR # BLD: 12.47 10*3/MM3 (ref 3.4–10.8)

## 2022-09-08 PROCEDURE — 99284 EMERGENCY DEPT VISIT MOD MDM: CPT

## 2022-09-08 PROCEDURE — 96376 TX/PRO/DX INJ SAME DRUG ADON: CPT

## 2022-09-08 PROCEDURE — 83880 ASSAY OF NATRIURETIC PEPTIDE: CPT | Performed by: STUDENT IN AN ORGANIZED HEALTH CARE EDUCATION/TRAINING PROGRAM

## 2022-09-08 PROCEDURE — 80306 DRUG TEST PRSMV INSTRMNT: CPT | Performed by: OBSTETRICS & GYNECOLOGY

## 2022-09-08 PROCEDURE — G0463 HOSPITAL OUTPT CLINIC VISIT: HCPCS

## 2022-09-08 PROCEDURE — G0378 HOSPITAL OBSERVATION PER HR: HCPCS

## 2022-09-08 PROCEDURE — G0480 DRUG TEST DEF 1-7 CLASSES: HCPCS | Performed by: OBSTETRICS & GYNECOLOGY

## 2022-09-08 PROCEDURE — 93010 ELECTROCARDIOGRAM REPORT: CPT | Performed by: INTERNAL MEDICINE

## 2022-09-08 PROCEDURE — 87086 URINE CULTURE/COLONY COUNT: CPT | Performed by: OBSTETRICS & GYNECOLOGY

## 2022-09-08 PROCEDURE — 80053 COMPREHEN METABOLIC PANEL: CPT | Performed by: OBSTETRICS & GYNECOLOGY

## 2022-09-08 PROCEDURE — 81001 URINALYSIS AUTO W/SCOPE: CPT | Performed by: OBSTETRICS & GYNECOLOGY

## 2022-09-08 PROCEDURE — 84484 ASSAY OF TROPONIN QUANT: CPT | Performed by: STUDENT IN AN ORGANIZED HEALTH CARE EDUCATION/TRAINING PROGRAM

## 2022-09-08 PROCEDURE — 85025 COMPLETE CBC W/AUTO DIFF WBC: CPT | Performed by: OBSTETRICS & GYNECOLOGY

## 2022-09-08 PROCEDURE — 93005 ELECTROCARDIOGRAM TRACING: CPT | Performed by: OBSTETRICS & GYNECOLOGY

## 2022-09-08 PROCEDURE — 96374 THER/PROPH/DIAG INJ IV PUSH: CPT

## 2022-09-08 PROCEDURE — 96375 TX/PRO/DX INJ NEW DRUG ADDON: CPT

## 2022-09-08 PROCEDURE — 83690 ASSAY OF LIPASE: CPT | Performed by: OBSTETRICS & GYNECOLOGY

## 2022-09-08 PROCEDURE — 0 HYDROMORPHONE 1 MG/ML SOLUTION: Performed by: OBSTETRICS & GYNECOLOGY

## 2022-09-08 PROCEDURE — 25010000002 ONDANSETRON PER 1 MG: Performed by: OBSTETRICS & GYNECOLOGY

## 2022-09-08 RX ORDER — SODIUM CHLORIDE 0.9 % (FLUSH) 0.9 %
10 SYRINGE (ML) INJECTION EVERY 12 HOURS SCHEDULED
Status: DISCONTINUED | OUTPATIENT
Start: 2022-09-08 | End: 2022-09-09 | Stop reason: HOSPADM

## 2022-09-08 RX ORDER — ONDANSETRON 2 MG/ML
4 INJECTION INTRAMUSCULAR; INTRAVENOUS EVERY 6 HOURS PRN
Status: DISCONTINUED | OUTPATIENT
Start: 2022-09-08 | End: 2022-09-09

## 2022-09-08 RX ORDER — PRENATAL VIT NO.126/IRON/FOLIC 28MG-0.8MG
1 TABLET ORAL DAILY
COMMUNITY

## 2022-09-08 RX ORDER — ALUMINA, MAGNESIA, AND SIMETHICONE 2400; 2400; 240 MG/30ML; MG/30ML; MG/30ML
15 SUSPENSION ORAL EVERY 6 HOURS PRN
Status: DISCONTINUED | OUTPATIENT
Start: 2022-09-08 | End: 2022-09-09

## 2022-09-08 RX ORDER — SODIUM CHLORIDE 0.9 % (FLUSH) 0.9 %
10 SYRINGE (ML) INJECTION AS NEEDED
Status: DISCONTINUED | OUTPATIENT
Start: 2022-09-08 | End: 2022-09-09

## 2022-09-08 RX ADMIN — ALUMINUM HYDROXIDE, MAGNESIUM HYDROXIDE, AND DIMETHICONE 15 ML: 400; 400; 40 SUSPENSION ORAL at 23:03

## 2022-09-08 RX ADMIN — ONDANSETRON 4 MG: 2 INJECTION INTRAMUSCULAR; INTRAVENOUS at 21:57

## 2022-09-08 RX ADMIN — HYDROMORPHONE HYDROCHLORIDE 0.5 MG: 1 INJECTION, SOLUTION INTRAMUSCULAR; INTRAVENOUS; SUBCUTANEOUS at 21:57

## 2022-09-08 RX ADMIN — HYDROMORPHONE HYDROCHLORIDE 1 MG: 1 INJECTION, SOLUTION INTRAMUSCULAR; INTRAVENOUS; SUBCUTANEOUS at 22:30

## 2022-09-08 RX ADMIN — SODIUM CHLORIDE, POTASSIUM CHLORIDE, SODIUM LACTATE AND CALCIUM CHLORIDE 1000 ML: 600; 310; 30; 20 INJECTION, SOLUTION INTRAVENOUS at 21:25

## 2022-09-09 ENCOUNTER — APPOINTMENT (OUTPATIENT)
Dept: CT IMAGING | Age: 32
DRG: 833 | End: 2022-09-09
Payer: MEDICAID

## 2022-09-09 ENCOUNTER — APPOINTMENT (OUTPATIENT)
Dept: GENERAL RADIOLOGY | Facility: HOSPITAL | Age: 32
End: 2022-09-09

## 2022-09-09 ENCOUNTER — HOSPITAL ENCOUNTER (INPATIENT)
Age: 32
LOS: 1 days | Discharge: HOME | DRG: 833 | End: 2022-09-09
Attending: PEDIATRICS | Admitting: PEDIATRICS
Payer: MEDICAID

## 2022-09-09 VITALS
HEIGHT: 65 IN | SYSTOLIC BLOOD PRESSURE: 114 MMHG | BODY MASS INDEX: 37.32 KG/M2 | HEART RATE: 103 BPM | OXYGEN SATURATION: 97 % | DIASTOLIC BLOOD PRESSURE: 90 MMHG | TEMPERATURE: 98.2 F | WEIGHT: 224 LBS | RESPIRATION RATE: 20 BRPM

## 2022-09-09 VITALS
DIASTOLIC BLOOD PRESSURE: 61 MMHG | HEART RATE: 74 BPM | RESPIRATION RATE: 18 BRPM | TEMPERATURE: 98.2 F | SYSTOLIC BLOOD PRESSURE: 116 MMHG | OXYGEN SATURATION: 98 %

## 2022-09-09 DIAGNOSIS — R07.9 CHEST PAIN, UNSPECIFIED TYPE: Primary | ICD-10-CM

## 2022-09-09 DIAGNOSIS — R10.13 EPIGASTRIC PAIN: ICD-10-CM

## 2022-09-09 DIAGNOSIS — Z34.90 PREGNANCY, UNSPECIFIED GESTATIONAL AGE: ICD-10-CM

## 2022-09-09 PROBLEM — R11.10 EMESIS: Status: ACTIVE | Noted: 2022-09-09

## 2022-09-09 LAB
ADENOVIRUS BY PCR: NOT DETECTED
ALBUMIN SERPL-MCNC: 3.5 G/DL (ref 3.5–5.2)
ALP BLD-CCNC: 101 U/L (ref 35–104)
ALT SERPL-CCNC: 69 U/L (ref 5–33)
ANION GAP SERPL CALCULATED.3IONS-SCNC: 11 MMOL/L (ref 7–19)
AST SERPL-CCNC: 42 U/L (ref 5–32)
BASOPHILS ABSOLUTE: 0 K/UL (ref 0–0.2)
BASOPHILS RELATIVE PERCENT: 0.1 % (ref 0–1)
BILIRUB SERPL-MCNC: 0.4 MG/DL (ref 0.2–1.2)
BORDETELLA PARAPERTUSSIS BY PCR: NOT DETECTED
BORDETELLA PERTUSSIS BY PCR: NOT DETECTED
BUN BLDV-MCNC: 6 MG/DL (ref 6–20)
CALCIUM SERPL-MCNC: 8.6 MG/DL (ref 8.6–10)
CHLAMYDOPHILIA PNEUMONIAE BY PCR: NOT DETECTED
CHLORIDE BLD-SCNC: 100 MMOL/L (ref 98–111)
CO2: 24 MMOL/L (ref 22–29)
CORONAVIRUS 229E BY PCR: NOT DETECTED
CORONAVIRUS HKU1 BY PCR: NOT DETECTED
CORONAVIRUS NL63 BY PCR: NOT DETECTED
CORONAVIRUS OC43 BY PCR: NOT DETECTED
CREAT SERPL-MCNC: 0.4 MG/DL (ref 0.5–0.9)
D DIMER PPP FEU-MCNC: 0.89 MCGFEU/ML (ref 0–0.5)
EOSINOPHILS ABSOLUTE: 0 K/UL (ref 0–0.6)
EOSINOPHILS RELATIVE PERCENT: 0 % (ref 0–5)
GFR AFRICAN AMERICAN: >59
GFR NON-AFRICAN AMERICAN: >60
GLUCOSE BLD-MCNC: 124 MG/DL (ref 74–109)
HCT VFR BLD CALC: 34.2 % (ref 37–47)
HEMOGLOBIN: 11.7 G/DL (ref 12–16)
HUMAN METAPNEUMOVIRUS BY PCR: NOT DETECTED
HUMAN RHINOVIRUS/ENTEROVIRUS BY PCR: NOT DETECTED
IMMATURE GRANULOCYTES #: 0.1 K/UL
INFLUENZA A BY PCR: NOT DETECTED
INFLUENZA B BY PCR: NOT DETECTED
LYMPHOCYTES ABSOLUTE: 1.8 K/UL (ref 1.1–4.5)
LYMPHOCYTES RELATIVE PERCENT: 12.5 % (ref 20–40)
MCH RBC QN AUTO: 30.5 PG (ref 27–31)
MCHC RBC AUTO-ENTMCNC: 34.2 G/DL (ref 33–37)
MCV RBC AUTO: 89.3 FL (ref 81–99)
MONOCYTES ABSOLUTE: 0.6 K/UL (ref 0–0.9)
MONOCYTES RELATIVE PERCENT: 4.1 % (ref 0–10)
MYCOPLASMA PNEUMONIAE BY PCR: NOT DETECTED
NEUTROPHILS ABSOLUTE: 11.9 K/UL (ref 1.5–7.5)
NEUTROPHILS RELATIVE PERCENT: 82.7 % (ref 50–65)
NT-PROBNP SERPL-MCNC: 10.9 PG/ML (ref 0–450)
PARAINFLUENZA VIRUS 1 BY PCR: NOT DETECTED
PARAINFLUENZA VIRUS 2 BY PCR: NOT DETECTED
PARAINFLUENZA VIRUS 3 BY PCR: NOT DETECTED
PARAINFLUENZA VIRUS 4 BY PCR: NOT DETECTED
PDW BLD-RTO: 12.9 % (ref 11.5–14.5)
PLATELET # BLD: 291 K/UL (ref 130–400)
PMV BLD AUTO: 9.4 FL (ref 9.4–12.3)
POTASSIUM REFLEX MAGNESIUM: 3.7 MMOL/L (ref 3.5–5)
QT INTERVAL: 362 MS
QTC INTERVAL: 450 MS
RBC # BLD: 3.83 M/UL (ref 4.2–5.4)
RESPIRATORY SYNCYTIAL VIRUS BY PCR: NOT DETECTED
SARS-COV-2, PCR: NOT DETECTED
SODIUM BLD-SCNC: 135 MMOL/L (ref 136–145)
TOTAL PROTEIN: 6.6 G/DL (ref 6.6–8.7)
TROPONIN T SERPL-MCNC: <0.01 NG/ML (ref 0–0.03)
TROPONIN: <0.01 NG/ML (ref 0–0.03)
TROPONIN: <0.01 NG/ML (ref 0–0.03)
WBC # BLD: 14.3 K/UL (ref 4.8–10.8)

## 2022-09-09 PROCEDURE — 36415 COLL VENOUS BLD VENIPUNCTURE: CPT

## 2022-09-09 PROCEDURE — G0378 HOSPITAL OBSERVATION PER HR: HCPCS

## 2022-09-09 PROCEDURE — 99285 EMERGENCY DEPT VISIT HI MDM: CPT

## 2022-09-09 PROCEDURE — 71045 X-RAY EXAM CHEST 1 VIEW: CPT

## 2022-09-09 PROCEDURE — 84484 ASSAY OF TROPONIN QUANT: CPT

## 2022-09-09 PROCEDURE — 71275 CT ANGIOGRAPHY CHEST: CPT

## 2022-09-09 PROCEDURE — 80053 COMPREHEN METABOLIC PANEL: CPT

## 2022-09-09 PROCEDURE — 85025 COMPLETE CBC W/AUTO DIFF WBC: CPT

## 2022-09-09 PROCEDURE — 85379 FIBRIN DEGRADATION QUANT: CPT | Performed by: STUDENT IN AN ORGANIZED HEALTH CARE EDUCATION/TRAINING PROGRAM

## 2022-09-09 PROCEDURE — 80299 QUANTITATIVE ASSAY DRUG: CPT | Performed by: STUDENT IN AN ORGANIZED HEALTH CARE EDUCATION/TRAINING PROGRAM

## 2022-09-09 PROCEDURE — 6370000000 HC RX 637 (ALT 250 FOR IP): Performed by: PEDIATRICS

## 2022-09-09 PROCEDURE — 96361 HYDRATE IV INFUSION ADD-ON: CPT

## 2022-09-09 PROCEDURE — 2580000003 HC RX 258: Performed by: PEDIATRICS

## 2022-09-09 PROCEDURE — 94640 AIRWAY INHALATION TREATMENT: CPT

## 2022-09-09 PROCEDURE — 1220000000 HC SEMI PRIVATE OB R&B

## 2022-09-09 PROCEDURE — 6360000002 HC RX W HCPCS: Performed by: PEDIATRICS

## 2022-09-09 PROCEDURE — 96374 THER/PROPH/DIAG INJ IV PUSH: CPT

## 2022-09-09 PROCEDURE — 71275 CT ANGIOGRAPHY CHEST: CPT | Performed by: RADIOLOGY

## 2022-09-09 PROCEDURE — 93005 ELECTROCARDIOGRAM TRACING: CPT | Performed by: PEDIATRICS

## 2022-09-09 PROCEDURE — 0202U NFCT DS 22 TRGT SARS-COV-2: CPT

## 2022-09-09 RX ORDER — CEFDINIR 300 MG/1
300 CAPSULE ORAL 2 TIMES DAILY
Qty: 10 CAPSULE | Refills: 0 | Status: SHIPPED | OUTPATIENT
Start: 2022-09-09 | End: 2022-09-14

## 2022-09-09 RX ORDER — ALUMINA, MAGNESIA, AND SIMETHICONE 2400; 2400; 240 MG/30ML; MG/30ML; MG/30ML
15 SUSPENSION ORAL ONCE
Status: DISCONTINUED | OUTPATIENT
Start: 2022-09-09 | End: 2022-09-09 | Stop reason: HOSPADM

## 2022-09-09 RX ORDER — IPRATROPIUM BROMIDE AND ALBUTEROL SULFATE 2.5; .5 MG/3ML; MG/3ML
1 SOLUTION RESPIRATORY (INHALATION)
Status: DISCONTINUED | OUTPATIENT
Start: 2022-09-09 | End: 2022-09-09

## 2022-09-09 RX ORDER — 0.9 % SODIUM CHLORIDE 0.9 %
1000 INTRAVENOUS SOLUTION INTRAVENOUS ONCE
Status: COMPLETED | OUTPATIENT
Start: 2022-09-09 | End: 2022-09-09

## 2022-09-09 RX ORDER — METOCLOPRAMIDE 10 MG/1
10 TABLET ORAL 4 TIMES DAILY
Qty: 120 TABLET | Refills: 3 | Status: ON HOLD | OUTPATIENT
Start: 2022-09-09 | End: 2022-10-26 | Stop reason: HOSPADM

## 2022-09-09 RX ORDER — MORPHINE SULFATE 4 MG/ML
4 INJECTION, SOLUTION INTRAMUSCULAR; INTRAVENOUS ONCE
Status: DISCONTINUED | OUTPATIENT
Start: 2022-09-09 | End: 2022-09-09

## 2022-09-09 RX ORDER — ONDANSETRON 2 MG/ML
4 INJECTION INTRAMUSCULAR; INTRAVENOUS ONCE
Status: COMPLETED | OUTPATIENT
Start: 2022-09-09 | End: 2022-09-09

## 2022-09-09 RX ORDER — LIDOCAINE HYDROCHLORIDE 20 MG/ML
15 SOLUTION OROPHARYNGEAL ONCE
Status: DISCONTINUED | OUTPATIENT
Start: 2022-09-09 | End: 2022-09-09 | Stop reason: HOSPADM

## 2022-09-09 RX ORDER — MORPHINE SULFATE 2 MG/ML
2 INJECTION, SOLUTION INTRAMUSCULAR; INTRAVENOUS ONCE
Status: DISCONTINUED | OUTPATIENT
Start: 2022-09-09 | End: 2022-09-09 | Stop reason: HOSPADM

## 2022-09-09 RX ORDER — 0.9 % SODIUM CHLORIDE 0.9 %
1000 INTRAVENOUS SOLUTION INTRAVENOUS ONCE
Status: DISCONTINUED | OUTPATIENT
Start: 2022-09-09 | End: 2022-09-09

## 2022-09-09 RX ORDER — ACETAMINOPHEN 325 MG/1
650 TABLET ORAL ONCE
Status: COMPLETED | OUTPATIENT
Start: 2022-09-09 | End: 2022-09-09

## 2022-09-09 RX ORDER — IPRATROPIUM BROMIDE AND ALBUTEROL SULFATE 2.5; .5 MG/3ML; MG/3ML
1 SOLUTION RESPIRATORY (INHALATION) ONCE
Status: COMPLETED | OUTPATIENT
Start: 2022-09-09 | End: 2022-09-09

## 2022-09-09 RX ADMIN — IPRATROPIUM BROMIDE AND ALBUTEROL SULFATE 1 AMPULE: 2.5; .5 SOLUTION RESPIRATORY (INHALATION) at 17:20

## 2022-09-09 RX ADMIN — LIDOCAINE HYDROCHLORIDE: 20 SOLUTION ORAL; TOPICAL at 14:40

## 2022-09-09 RX ADMIN — ONDANSETRON 4 MG: 2 INJECTION INTRAMUSCULAR; INTRAVENOUS at 13:33

## 2022-09-09 RX ADMIN — SODIUM CHLORIDE 1000 ML: 9 INJECTION, SOLUTION INTRAVENOUS at 13:33

## 2022-09-09 RX ADMIN — ACETAMINOPHEN 650 MG: 325 TABLET ORAL at 19:50

## 2022-09-09 NOTE — ED NOTES
PT RECEIVED FROM R AFTER EVALUATION.  THE PATIENT REPORTED LOWER ABDOMINAL PAIN AND NEEDED EVALUATION IN THE ER FOR FURTHER MEDICAL DIFFERENTIAL.

## 2022-09-09 NOTE — NURSING NOTE
Patient unable to be traced from  Admission until 2332. RN at bedside trying to trace fetal heart tones, tracing maternal heart tones due to maternal positioning. Multiple attempts made by this nurse, JOLANTA Richard RN and HAKEEM Singletary RN. Patient unable to lay still for monitoring. MD aware and has met with patient at bedside.

## 2022-09-09 NOTE — DISCHARGE INSTRUCTIONS
Today left AGAINST MEDICAL ADVICE.  You have evidence of urinary tract affection please take the prescribed cefdinir over the next 5 days.  I discussed we did not complete her work-up so I cannot say that you do not have significant diagnoses such as appendicitis although your labs were reassuring.  Please follow-up with your OB/GYN later today.  Please immediately return to the emergency department if you change your mind and would like further work-up.

## 2022-09-09 NOTE — ED PROVIDER NOTES
EMERGENCY DEPARTMENT HISTORY AND PHYSICAL EXAM    Patient Name: Emmie Noonan    Chief Complaint   Patient presents with   • Abdominal Cramping       History of Presenting Illness:  Emmie Noonan is a 32 y.o. female currently 29 weeks pregnant who presents to the emergency department after being seen in the obstetrics triage area after reported clearance by OB for her abdominal symptoms.    Patient had presented to OB/GYN triage with abdominal cramping.  Patient had a history of prior cholecystectomy.  The obstetrics physician had spoken with me when she was in OBGYN triage asking for advice regarding patient's work-up.  Per report from nursing staff who brought patient to this emergency department she had been completely cleared from an obstetrics perspective.  Patient started complaining of shortness of breath later in her workup which is why they then brought her to the emergency department.  She was not complaining of shortness of breath prior.    On review of their work-up patient was given a dose of 0.5 mg dilaudid as well as a dose of 1 mg of Dilaudid.  Also given a GI cocktail.  Given 1 L of IV fluids. Prior labs notable for unremarkable CMP except for slight elevated AST and ALT to 69 and 40.  Urinalysis with 1+ bacteria but only 3-5 white blood cells with 3-6 squamous cells.  Small leukocytes positive nitrites. Urine drug screen has been positive for THC as well as methamphetamine.    On my interview the patient states she started having epigastric abdominal pain today.  No obvious exacerbating or alleviating factors.  She denies any lower abdominal pain to me.  Denies any lower abdominal cramping any, vaginal discharge, or vaginal bleeding.  Denies any dysuria.  No recent fever or chills.  Endorsing some shortness of breath but denies any chest pain.  Did admit to me that she has been using methamphetamine as recent as 3 days ago and does use marijuana frequently as well as smokes tobacco frequently.   "She denies alcohol use.  Denies opiate use.      Past Medical History:   Past Medical History:   Diagnosis Date   • Anxiety    • Bipolar 1 disorder (HCC)    • Bipolar 1 disorder (HCC)    • Depression        Past Surgical History:   Past Surgical History:   Procedure Laterality Date   •  SECTION     • CHOLECYSTECTOMY         Social History:  Daily tobacco  Denies EtOH  Frequent marijuana, methamphetamines    Allergies:  No Known Allergies    Medications:   No current facility-administered medications for this encounter.    Current Outpatient Medications:   •  prenatal vitamin (prenatal, CLASSIC, vitamin) tablet, Take 1 tablet by mouth Daily., Disp: , Rfl:   •  cefdinir (OMNICEF) 300 MG capsule, Take 1 capsule by mouth 2 (Two) Times a Day for 5 days., Disp: 10 capsule, Rfl: 0    Review of Systems:  A full review of systems was obtained and is negative unless otherwise stated in HPI.    Physical Exam:   VS: /90 (BP Location: Right arm, Patient Position: Sitting)   Pulse 103   Temp 98.2 °F (36.8 °C) (Oral)   Resp 20   Ht 165.1 cm (65\")   Wt 102 kg (224 lb)   SpO2 97%   Breastfeeding No   BMI 37.28 kg/m²   GENERAL: uncomfortable appearing notably gravid young woman sitting up in stretcher; well nourished, well developed, awake, alert, nontoxic appearing  EYES: PERRL, sclera anicteric, extra-occular movements grossly intact, symmetric lids  EARS, NOSE, MOUTH, THROAT: atraumatic external nose and ears, moist mucous membranes  NECK: Symmetric, trachea midline, no thyromegaly, no adenopathy, no meningismus  RESPIRATORY: Unlabored respiratory effort, clear to auscultation bilaterally, good air movement  CARDIOVASCULAR: No murmurs or gallops, peripheral pulses 2+ and equal in all extremities  GI: Soft, gravid with palpable uterus, nontender, nondistended, bowel sounds present, no hepatosplenomegaly  LYMPHATIC: no lymphadenopathy  MUSCULOSKELETAL/EXTREMITIES: Extremities without obvious deformity, no " cyanosis or clubbing  SKIN: warm and dry with no obvious rashes  NEUROLOGIC: moving all 4 extremities symmetrically, CN II-XII grossly intact  PSYCHIATRIC: alert, pleasant and cooperative. Appropriate mood and affect.      Labs:  Labs Reviewed   URINALYSIS W/ CULTURE IF INDICATED - Abnormal; Notable for the following components:       Result Value    Color, UA Dark Yellow (*)     Appearance, UA Cloudy (*)     Specific Gravity, UA >1.030 (*)     Ketones, UA Trace (*)     Bilirubin, UA Small (1+) (*)     Protein,  mg/dL (2+) (*)     Leuk Esterase, UA Small (1+) (*)     Nitrite, UA Positive (*)     All other components within normal limits    Narrative:     In absence of clinical symptoms, the presence of pyuria, bacteria, and/or nitrites on the urinalysis result does not correlate with infection.   URINE DRUG SCREEN - Abnormal; Notable for the following components:    THC, Screen, Urine Positive (*)     Methamphetamine, Ur Positive (*)     Amphetamine Screen, Urine Positive (*)     All other components within normal limits    Narrative:     Cutoff For Drugs Screened:    Amphetamines               500 ng/ml  Barbiturates               200 ng/ml  Benzodiazepines            150 ng/ml  Cocaine                    150 ng/ml  Methadone                  200 ng/ml  Opiates                    100 ng/ml  Phencyclidine               25 ng/ml  THC                            50 ng/ml  Methamphetamine            500 ng/ml  Tricyclic Antidepressants  300 ng/ml  Oxycodone                  100 ng/ml  Propoxyphene               300 ng/ml  Buprenorphine               10 ng/ml    The normal value for all drugs tested is negative. This report includes unconfirmed screening results, with the cutoff values listed, to be used for medical treatment purposes only.  Unconfirmed results must not be used for non-medical purposes such as employment or legal testing.  Clinical consideration should be applied to any drug of abuse test,  particularly when unconfirmed results are used.     CBC WITH AUTO DIFFERENTIAL - Abnormal; Notable for the following components:    WBC 12.47 (*)     Eosinophil % 0.2 (*)     Neutrophils, Absolute 9.19 (*)     All other components within normal limits   URINALYSIS, MICROSCOPIC ONLY - Abnormal; Notable for the following components:    RBC, UA 0-2 (*)     WBC, UA 3-5 (*)     Bacteria, UA 1+ (*)     Squamous Epithelial Cells, UA 3-6 (*)     All other components within normal limits   COMPREHENSIVE METABOLIC PANEL - Abnormal; Notable for the following components:    Glucose 130 (*)     Creatinine 0.55 (*)     Albumin 3.30 (*)     ALT (SGPT) 69 (*)     AST (SGOT) 40 (*)     All other components within normal limits    Narrative:     GFR Normal >60  Chronic Kidney Disease <60  Kidney Failure <15     D-DIMER, QUANTITATIVE - Abnormal; Notable for the following components:    D-Dimer, Quantitative 0.89 (*)     All other components within normal limits    Narrative:     Reference Range is 0-0.50 MCGFEU/mL. However, results <0.50 MCGFEU/mL tends to rule out DVT or PE. Results >0.50 MCGFEU/mL are not useful in predicting absence or presence of DVT or PE.     LIPASE - Normal   TROPONIN (IN-HOUSE) - Normal    Narrative:     Troponin T Reference Range:  <= 0.03 ng/mL-   Negative for AMI  >0.03 ng/mL-     Abnormal for myocardial necrosis.  Clinicians would have to utilize clinical acumen, EKG, Troponin and serial changes to determine if it is an Acute Myocardial Infarction or myocardial injury due to an underlying chronic condition.       Results may be falsely decreased if patient taking Biotin.     BNP (IN-HOUSE) - Normal    Narrative:     Among patients with dyspnea, NT-proBNP is highly sensitive for the detection of acute congestive heart failure. In addition NT-proBNP of <300 pg/ml effectively rules out acute congestive heart failure with 99% negative predictive value.    Results may be falsely decreased if patient taking  Biotin.     URINE CULTURE   CANNABINOIDS, CONF, MS, UR   AMPHETAMINES, TOXASSURE, UR   FENTANYL AND METABOLITE   CBC AND DIFFERENTIAL    Narrative:     The following orders were created for panel order CBC & Differential.  Procedure                               Abnormality         Status                     ---------                               -----------         ------                     CBC Auto Differential[[id number]]        Abnormal            Final result                 Please view results for these tests on the individual orders.         Radiology:  XR Chest 1 View   ED Interpretation   No evidence of focal consolidation or pneumonia.  Normal chest x-ray.            Medical Decision Making:  Emmie Noonan is a 32 y.o. female currently 29 weeks pregnant who presented to obstetrics triage in the setting of some cramping abdominal pain and was reportedly cleared by obstetrics and gynecology but later in course of her work-up was complaining shortness of breath and was referred to the emergency department.    Patient tachycardic in the low 100s.  Otherwise no hypoxia.  Afebrile.  Patient with unremarkable blood pressures.    Immediately after her stay in the emergency department patient requesting to leave.  I discussed that I cannot rule out significant causes of her symptoms that could result in harm to her baby and harm to the patient.  She then changed her mind and was agreeable to stay for further work-up.    I have reviewed and interpreted the EKG and it shows: NSR, rate of 93. Normal axis and intervals. No signs of acute ischemia such as ST elevation, ST depression, or T wave inversion. No signs of Hyperkalemia, WPW, PE, Pericarditis, LV aneurysm, Brugada, Heart Block, Atrial fibrillation or A flutter.     Labs were ordered and reviewed in addition to OBGYN prior workup.  Elevated D-dimer 0.89.  Otherwise urinalysis with only 3-5 white blood cells but 1+ bacteria in the setting of squamous cells  concerning for asymptomatic bacteria in pregnancy.  Otherwise negative BNP.  Negative troponin.  Negative lipase.  CMP with slight AST and ALT elevation and decreased albumin but otherwise no significant abnormalities.  Slight leukocytosis to 12.47.  Urine drug screen notably positive for amphetamines as well as THC.    Per years algorithm for pulmonary embolism, not consistent with pulmonary embolism as is less than 1.0.     Imaging was ordered and reviewed.  Chest x-ray per my read with no acute abnormality.    Nursing notes were reviewed.    Prior to completion of our work-up, the patient again requested to leave the ED against medical advice.    The patient is competent to refuse medical care. The patient is responding and asking questions appropriately. The patient is oriented to person, place and time. The patient is not psychotic, delusional, suicidal, homicidal or hallucinating. The patient demonstrates a normal mental capacity to make decisions regarding their healthcare. The patient is clinically sober.    The patient has been advised of the risks, in layman terms, of leaving AMA which include, but are not limited to:  Death, death of her baby, coma, permanent disability, loss of current lifestyle, pain, delay in diagnosis.    Alternatives have been offered - the patient remains steadfast in their wish to leave.    The patient has been advised that should they change their mind they are welcome to return to this hospital, or any other, at any time.    The patient understands that in no way does an AMA discharge mean that I do not want them to have the best medical care available. To this end, I have provided appropriate prescriptions, referrals, and discharge instructions to the extent possible.    Family members (patient's sister), allowed to participate by the patient were included in the discussion.    I wrote up prescription for the patient for cefdinir to treat asymptomatic bacteria during pregnancy  but patient eloped prior to receiving paperwork.  Prescription is sent to her pharmacy.  Prior to patient's elopement during AMA discussion, I gave strict return precautions to the emergency department, encouraged her to follow-up closely with her OB/GYN if she does not want to return to the emergency department for completion of her work-up.    Unclear exact etiology of the patient's complete presentation.  In setting of her abdominal pain out of consider placental abruption, miscarriage but per report from obstetrics and gynecology nursing, reportedly cleared for such abnormalities. Regarding her abdominal pain it is really epigastric in nature.  Given her prior history cholecystectomy would consider choledocholithiasis given normal lipase and pretty unremarkable liver enzymes I do not feel this is likely.  Would consider gastritis as well as peptic ulcer disease. I would consider appendicitis, diverticulitis, bowel obstruction given her prior cholecystectomy.  I would also consider opiate withdrawal given patient's polysubstance abuse.  Although she has negative testing for opiates I would consider fentanyl use given her methamphetamine on urine drug testing.  This would explain her nausea and vomiting cramping abdominal pain but patient denies she is using opiates so unclear at this time.  She is afebrile with only slightly elevated white blood cell count and the area of her pain is epigastric but given her notably gravid uterus I would consider appendicitis given her stage of pregnancy, since this can present in a non-classic area due to gravid uterus. Vital signs with no significant tachycardia, no fevers, make sepsis unlikely.  Regarding her shortness of breath, it only occurred after her reported completion of work-up by obstruction gynecology was not really why she presented to the emergency department.  Regardless, patient with no tachypnea no hypoxia.  Given D-dimer value, negative per YEARS criteria for  pulmonary embolism.  No evidence of focal consolidation or pneumothorax on chest x-ray per my read.  Patient with no infectious symptoms such as nasal congestion or cough that are concerning for viral upper respiratory tract infection.    Patient left AGAINST MEDICAL ADVICE prior to completion of work-up.      ED Diagnosis:  Third trimester pregnancy; Polysubstance abuse (HCC); Shortness of breath; Abdominal pain, acute, epigastric      Disposition: left against medical advice, eloped prior to receiving return precaution instructions and outpatient antibiotic prescription    Follow up plan: return to ED immediately if change mind for continued workup, outpatient follow up with OBGYN as soon as possible        Signed:  Jose De Jesus Lopez MD  Emergency Medicine Physician    Please note that portions of this note were completed with a voice recognition program.      Jose De Jesus Lopez MD  09/09/22 0934

## 2022-09-09 NOTE — ED NOTES
Patient seen leaving ER. Not able to give instructions or obtain BP. Patient uncooperative and unwilling to remain here and left AMA

## 2022-09-10 LAB — BACTERIA SPEC AEROBE CULT: NORMAL

## 2022-09-10 NOTE — DISCHARGE INSTRUCTIONS
LABOR AND DELIVERY - OBSERVATION DISCHARGE INSTRUCTIONS    TERM LABOR: RETURN TO HOSPITAL IF:    PRE-TERM LABOR  __Your gestational age is 33 weeks: term pregnancy starts at 42 weeks. __Fill your prescription and take as directed. __Bed rest (left lying position is best). __Refrain from sexual intercourse until you see your physician again. __No heavy lifting or strenuous activity. RETURN TO HOSPITAL IF:  __Contractions occur 3-4 in any hour (every 10-15 minutes), maybe with or without pain. __Rhythmic or constant menstrual-like cramps  __Rhythmic or constant lower, dull backache may radiate to the sides or front. Increase or change in vaginal discharge, may be watery, pink, red or brown-tinged. PRE-ECLAMPSIA  __Bed rest, left side lying position  __Elevate legs while sitting  __Maintain quiet, peaceful environment  __Eat well-balanced meals, no added salt, avoid processed lunch meats, canned soups, potato chips, etc.    SYMPTOMS THAT REQUIRE PROMPT REPORTING:  __Rapid gain in weight  __Persistent or severe headache  __Visual disturbances (spots, blurred)  __Nausea/vomiting, with or without upper abdominal pain. __Increase or persistent swelling (face puffiness, hands, legs, ankles)  __Decreased urinary output  __Drowsiness listlessness                OTHER INSTRUCTIONS  __Keep next scheduled appointment to see your attending physician.   __After today's visit, you will need to return to registration and pre register for your next visit     NOTE: If you do not begin to feel better, or you have any questions, contact your physician or call [phone], or return to the hospital.

## 2022-09-10 NOTE — FLOWSHEET NOTE
Pt arrived from ER having been evaluated for chest pain. Was cleared by ER and is now here for NST. Pt refused to get in bed and states she is ready to go home. Discussed importance of checking fetal well-being and told her it would only take 20 minutes or so for an NST. Pt states she will allow it. To bed. EFM placed on soft, nontender abdomen.

## 2022-09-10 NOTE — FLOWSHEET NOTE
Pt falls asleep for a few minutes. Awakened by admission questions. Pt then denies having and chest pain or other pain.

## 2022-09-10 NOTE — PROGRESS NOTES
Pt arrived to unit as a transfer from the emergency department with complaints of chest pain. Pt denies any leaking of vaginal fluid or vaginal bleeding. TOCO and EFM applied to a soft, non-tender abdomen.

## 2022-09-10 NOTE — FLOWSHEET NOTE
Notified Dr Moon Moreau of NST results and pt status with updated VS. Orders rec'd to D/C pt to home.

## 2022-09-10 NOTE — ED NOTES
Pt ambulated to bathroom independently without difficulty or distress.      Leatha Henriquez RN  09/09/22 1945

## 2022-09-12 LAB
EKG P AXIS: 45 DEGREES
EKG P AXIS: 67 DEGREES
EKG P-R INTERVAL: 134 MS
EKG P-R INTERVAL: 140 MS
EKG Q-T INTERVAL: 366 MS
EKG Q-T INTERVAL: 376 MS
EKG QRS DURATION: 96 MS
EKG QRS DURATION: 98 MS
EKG QTC CALCULATION (BAZETT): 406 MS
EKG QTC CALCULATION (BAZETT): 426 MS
EKG T AXIS: 14 DEGREES
EKG T AXIS: 30 DEGREES

## 2022-09-12 PROCEDURE — 93010 ELECTROCARDIOGRAM REPORT: CPT | Performed by: INTERNAL MEDICINE

## 2022-09-12 ASSESSMENT — ENCOUNTER SYMPTOMS
BACK PAIN: 1
COLOR CHANGE: 0
ABDOMINAL PAIN: 1
NAUSEA: 1
EYE DISCHARGE: 0
DIARRHEA: 0
SHORTNESS OF BREATH: 1
VOMITING: 1
COUGH: 0
RHINORRHEA: 0

## 2022-09-12 NOTE — ED PROVIDER NOTES
[facility] LABOR AND DELIVERY  eMERGENCY dEPARTMENT eNCOUnter      Pt Name: Yehuda Joseph  MRN: [id number]  [place] 1990  Date of evaluation: 2022  Provider: Chaparrita Vera MD    CHIEF COMPLAINT       Chief Complaint   Patient presents with    Chest Pain     CP and emesis started yesterday; 29 weeks pregnant           HISTORY OF PRESENT ILLNESS   (Location/Symptom, Timing/Onset,Context/Setting, Quality, Duration, Modifying Factors, Severity)  Note limiting factors. Yehuda Joseph is a 28 y.o. female who presents to the emergency department with chest pain and upper abdominal pain. Patient is pregnant at 29weeks gestation and is . Patient doesn't know date of LMP. Patient states pain began yesterday. Pain is continuous. Chest pain described as tightness. Worsens with deep breath. No alleviated factors. Associated symptoms include shortness of breath, body aches, back pain, vomiting, and abdominal pain. Patient denies vaginal bleeding, lower abdominal pain, lower back pain, black or bloody stools, fever or diarrhea. HPI    NursingNotes were reviewed. REVIEW OF SYSTEMS    (2-9 systems for level 4, 10 or more for level 5)     Review of Systems   Constitutional:  Negative for chills and fever. HENT:  Negative for congestion and rhinorrhea. Eyes:  Negative for discharge. Respiratory:  Positive for shortness of breath. Negative for cough. Cardiovascular:  Positive for chest pain. Negative for palpitations. Gastrointestinal:  Positive for abdominal pain, nausea and vomiting. Negative for diarrhea. Genitourinary:  Negative for difficulty urinating, dysuria, flank pain and vaginal bleeding. Musculoskeletal:  Positive for back pain. Negative for neck pain. Skin:  Negative for color change and pallor. Neurological:  Negative for syncope and light-headedness. Psychiatric/Behavioral:  Negative for agitation and decreased concentration.     All other systems reviewed and are negative. PAST MEDICALHISTORY   History reviewed. No pertinent past medical history. SURGICAL HISTORY     History reviewed. No pertinent surgical history. CURRENT MEDICATIONS     Discharge Medication List as of 9/9/2022  8:59 PM          ALLERGIES     Patient has no known allergies. FAMILY HISTORY     History reviewed. No pertinent family history. SOCIAL HISTORY       Social History     Socioeconomic History    Marital status:      Spouse name: None    Number of children: None    Years of education: None    Highest education level: None       SCREENINGS    Henderson Coma Scale  Eye Opening: Spontaneous  Best Verbal Response: Oriented  Best Motor Response: Obeys commands  Henderson Coma Scale Score: 15        PHYSICAL EXAM    (up to 7 for level 4, 8 or more for level 5)     ED Triage Vitals   BP Temp Temp Source Heart Rate Resp SpO2 Height Weight   09/09/22 1210 09/09/22 1210 09/09/22 1443 09/09/22 1210 09/09/22 1210 09/09/22 1210 -- --   (!) 140/66 98.5 °F (36.9 °C) Oral 80 20 100 %         Physical Exam  Vitals and nursing note reviewed. Constitutional:       General: She is not in acute distress. Comments: Appears uncomfortable. HENT:      Head: Normocephalic and atraumatic. Right Ear: External ear normal.      Left Ear: External ear normal.      Nose: Congestion present. No rhinorrhea. Mouth/Throat:      Mouth: Mucous membranes are moist.      Pharynx: Oropharynx is clear. No oropharyngeal exudate. Eyes:      General: No scleral icterus. Conjunctiva/sclera: Conjunctivae normal.      Pupils: Pupils are equal, round, and reactive to light. Cardiovascular:      Rate and Rhythm: Normal rate and regular rhythm. Pulses: Normal pulses. Heart sounds: Normal heart sounds. Pulmonary:      Effort: Pulmonary effort is normal. No respiratory distress. Breath sounds: Normal breath sounds. No stridor. No wheezing, rhonchi or rales.    Abdominal: General: Bowel sounds are normal.      Palpations: Abdomen is soft. Tenderness: There is no abdominal tenderness. There is no guarding or rebound. Comments: Gravid consistent with dates. Musculoskeletal:         General: No tenderness or deformity. Cervical back: Neck supple. No rigidity. Right lower leg: No edema. Left lower leg: No edema. Skin:     General: Skin is warm and dry. Capillary Refill: Capillary refill takes less than 2 seconds. Coloration: Skin is not jaundiced. Neurological:      General: No focal deficit present. Mental Status: She is alert and oriented to person, place, and time. Mental status is at baseline. Cranial Nerves: No cranial nerve deficit. Sensory: No sensory deficit. Motor: No weakness. Coordination: Coordination normal.   Psychiatric:         Mood and Affect: Mood normal.         Behavior: Behavior normal.       DIAGNOSTIC RESULTS     EKG: All EKG's areinterpreted by the Emergency Department Physician who either signs or Co-signs this chart in the absence of a cardiologist.    Saintclair Berliner dated 9/9/2022 at 12:06 PM: Normal sinus rhythm, rate 83. , , QTc 442. Artifact present otherwise normal EKG. EKG dated 9/9/2022 at 18 12 PM: Normal sinus rhythm with sinus arrhythmia. Rate 77. , QRS 98, QTc 426. Normal EKG. RADIOLOGY:  Non-plain film images such as CT, Ultrasound and MRI are read by the radiologist. Plain radiographic images are visualized and preliminarily interpreted bythe emergency physician with the below findings:      CTA PULMONARY W CONTRAST   Final Result   1. Negative study. No evidence of acute or chronic pulmonary thromboembolism   2. No active infiltrates, pleural effusion or pneumothorax   Recommendation: Follow up as clinically indicated.    All CT scans at this facility utilize dose modulation, iterative reconstruction, and/or weight based dosing when appropriate to reduce radiation dose to as low as reasonably achievable. Electronically Signed by Stacie Roth at 09-Sep-2022 08:25:05 PM                       LABS:  Labs Reviewed   CBC WITH AUTO DIFFERENTIAL - Abnormal; Notable for the following components:       Result Value    WBC 14.3 (*)     RBC 3.83 (*)     Hemoglobin 11.7 (*)     Hematocrit 34.2 (*)     Neutrophils % 82.7 (*)     Lymphocytes % 12.5 (*)     Neutrophils Absolute 11.9 (*)     All other components within normal limits   COMPREHENSIVE METABOLIC PANEL W/ REFLEX TO MG FOR LOW K - Abnormal; Notable for the following components:    Sodium 135 (*)     Glucose 124 (*)     Creatinine 0.4 (*)     ALT 69 (*)     AST 42 (*)     All other components within normal limits   RESPIRATORY PANEL, MOLECULAR, WITH COVID-19   TROPONIN   TROPONIN       All other labs were within normal range or not returned as of this dictation. EMERGENCY DEPARTMENT COURSE and DIFFERENTIAL DIAGNOSIS/MDM:   Vitals:    Vitals:    09/09/22 1210 09/09/22 1443 09/09/22 1514 09/09/22 1614   BP: (!) 140/66 111/62 108/69 116/61   Pulse: 80 84 79 74   Resp: 20 18  18   Temp: 98.5 °F (36.9 °C) 98.2 °F (36.8 °C)  98.2 °F (36.8 °C)   TempSrc:  Oral  Oral   SpO2: 100% 100% 98% 98%       MDM     Amount and/or Complexity of Data Reviewed  Clinical lab tests: reviewed  Tests in the radiology section of CPT®: reviewed    59-year-old female currently at 34 weeks gestation presents with chest and abdominal pain. Lab, EKG, and radiology results reviewed. CTA negative for PE. Patient will be discharged from the emergency department to go to labor and delivery for monitoring and further evaluation. Patient will follow-up with her primary care provider or [facility] care clinic and OB, Dr Song Pi. CONSULTS:  None    PROCEDURES:  Unless otherwise noted below, none     Procedures    FINAL IMPRESSION      1. Chest pain, unspecified type    2. Epigastric pain    3.  Pregnancy, unspecified gestational age DISPOSITION/PLAN   DISPOSITION Admitted 09/09/2022 08:25:20 PM      PATIENT REFERRED TO:  No follow-up provider specified.     DISCHARGE MEDICATIONS:  Discharge Medication List as of 9/9/2022  8:59 PM        START taking these medications    Details   metoclopramide (REGLAN) 10 MG tablet Take 1 tablet by mouth 4 times daily, Disp-120 tablet, R-3Normal                (Please note that portions of this note were completed with a voice recognition program.  Efforts were made to edit thedictations but occasionally words are mis-transcribed.)    Rosalia Snell MD (electronically signed)  Attending Emergency Physician          Rosalia Snell MD  09/12/22 [id number]

## 2022-09-14 LAB
AMPHET/CREAT UR: >2273 NG/MG CREAT
AMPHETAMINES UR QL CFM: NORMAL
FENTANYL SERPL-MCNC: <0.1 NG/ML (ref 0.3–1.5)
LEVEL OF DETECTION:: NORMAL
MDA/CREAT UR: NOT DETECTED NG/MG CREAT
MDMA/CREAT UR: NOT DETECTED NG/MG CREAT
METHAMPHET/CREAT UR: >1136 NG/MG CREAT
NORFENTANYL SERPL-MCNC: <0.1 NG/ML

## 2022-09-15 LAB
CANNABINOIDS UR QL CFM: NORMAL
CARBOXYTHC/CREAT UR: 63 NG/MG CREAT
LEVEL OF DETECTION:: NORMAL

## 2022-10-24 LAB
ABO/RH: NORMAL
ANTIBODY SCREEN: NORMAL

## 2022-10-25 ENCOUNTER — HOSPITAL ENCOUNTER (INPATIENT)
Age: 32
LOS: 1 days | Discharge: HOME | DRG: 833 | End: 2022-10-26
Attending: OBSTETRICS & GYNECOLOGY | Admitting: OBSTETRICS & GYNECOLOGY
Payer: MEDICAID

## 2022-10-25 PROBLEM — O16.3 HYPERTENSION AFFECTING PREGNANCY IN THIRD TRIMESTER: Status: ACTIVE | Noted: 2022-10-25

## 2022-10-25 LAB
ABO/RH: NORMAL
ALBUMIN SERPL-MCNC: 3 G/DL (ref 3.5–5.2)
ALP BLD-CCNC: 144 U/L (ref 35–104)
ALT SERPL-CCNC: 57 U/L (ref 5–33)
AMORPHOUS: ABNORMAL /HPF
AMPHETAMINE SCREEN, URINE: NEGATIVE
ANION GAP SERPL CALCULATED.3IONS-SCNC: 10 MMOL/L (ref 7–19)
ANTIBODY SCREEN: NORMAL
AST SERPL-CCNC: 44 U/L (ref 5–32)
BACTERIA: ABNORMAL /HPF
BARBITURATE SCREEN URINE: NEGATIVE
BASOPHILS ABSOLUTE: 0 K/UL (ref 0–0.2)
BASOPHILS RELATIVE PERCENT: 0.2 % (ref 0–1)
BENZODIAZEPINE SCREEN, URINE: NEGATIVE
BILIRUB SERPL-MCNC: <0.2 MG/DL (ref 0.2–1.2)
BILIRUBIN URINE: NEGATIVE
BLOOD, URINE: NEGATIVE
BUN BLDV-MCNC: 11 MG/DL (ref 6–20)
BUPRENORPHINE URINE: NEGATIVE
CALCIUM SERPL-MCNC: 9.4 MG/DL (ref 8.6–10)
CANNABINOID SCREEN URINE: NEGATIVE
CHLORIDE BLD-SCNC: 103 MMOL/L (ref 98–111)
CLARITY: ABNORMAL
CO2: 24 MMOL/L (ref 22–29)
COCAINE METABOLITE SCREEN URINE: NEGATIVE
COLOR: ABNORMAL
CREAT SERPL-MCNC: 0.6 MG/DL (ref 0.5–0.9)
CREATININE URINE: 161.8 MG/DL (ref 4.2–622)
EOSINOPHILS ABSOLUTE: 0.1 K/UL (ref 0–0.6)
EOSINOPHILS RELATIVE PERCENT: 0.7 % (ref 0–5)
EPITHELIAL CELLS, UA: ABNORMAL /HPF
GFR SERPL CREATININE-BSD FRML MDRD: >60 ML/MIN/{1.73_M2}
GLUCOSE BLD-MCNC: 105 MG/DL (ref 74–109)
GLUCOSE URINE: NEGATIVE MG/DL
HCT VFR BLD CALC: 36.9 % (ref 37–47)
HEMOGLOBIN: 12.5 G/DL (ref 12–16)
IMMATURE GRANULOCYTES #: 0 K/UL
KETONES, URINE: ABNORMAL MG/DL
LEUKOCYTE ESTERASE, URINE: ABNORMAL
LYMPHOCYTES ABSOLUTE: 2.4 K/UL (ref 1.1–4.5)
LYMPHOCYTES RELATIVE PERCENT: 22.7 % (ref 20–40)
Lab: NORMAL
MCH RBC QN AUTO: 30 PG (ref 27–31)
MCHC RBC AUTO-ENTMCNC: 33.9 G/DL (ref 33–37)
MCV RBC AUTO: 88.5 FL (ref 81–99)
METHADONE SCREEN, URINE: NEGATIVE
METHAMPHETAMINE, URINE: NEGATIVE
MONOCYTES ABSOLUTE: 0.7 K/UL (ref 0–0.9)
MONOCYTES RELATIVE PERCENT: 6.4 % (ref 0–10)
NEUTROPHILS ABSOLUTE: 7.2 K/UL (ref 1.5–7.5)
NEUTROPHILS RELATIVE PERCENT: 69.7 % (ref 50–65)
NITRITE, URINE: NEGATIVE
OPIATE SCREEN URINE: NEGATIVE
OXYCODONE URINE: NEGATIVE
PDW BLD-RTO: 13 % (ref 11.5–14.5)
PH UA: 6.5 (ref 5–8)
PHENCYCLIDINE SCREEN URINE: NEGATIVE
PLATELET # BLD: 217 K/UL (ref 130–400)
PMV BLD AUTO: 10.2 FL (ref 9.4–12.3)
POTASSIUM SERPL-SCNC: 3.9 MMOL/L (ref 3.5–5)
PROPOXYPHENE SCREEN: NEGATIVE
PROTEIN PROTEIN: 15 MG/DL (ref 15–45)
PROTEIN UA: ABNORMAL MG/DL
RBC # BLD: 4.17 M/UL (ref 4.2–5.4)
RBC UA: ABNORMAL /HPF (ref 0–2)
SODIUM BLD-SCNC: 137 MMOL/L (ref 136–145)
SPECIFIC GRAVITY UA: 1.02 (ref 1–1.03)
TOTAL PROTEIN: 6.6 G/DL (ref 6.6–8.7)
TRICYCLIC, URINE: NEGATIVE
URIC ACID, SERUM: 5.2 MG/DL (ref 2.4–5.7)
UROBILINOGEN, URINE: 1 E.U./DL
WBC # BLD: 10.3 K/UL (ref 4.8–10.8)
WBC UA: ABNORMAL /HPF (ref 0–5)

## 2022-10-25 PROCEDURE — 6360000002 HC RX W HCPCS: Performed by: OBSTETRICS & GYNECOLOGY

## 2022-10-25 PROCEDURE — 81001 URINALYSIS AUTO W/SCOPE: CPT

## 2022-10-25 PROCEDURE — 87522 HEPATITIS C REVRS TRNSCRPJ: CPT

## 2022-10-25 PROCEDURE — 80306 DRUG TEST PRSMV INSTRMNT: CPT

## 2022-10-25 PROCEDURE — 84550 ASSAY OF BLOOD/URIC ACID: CPT

## 2022-10-25 PROCEDURE — 86850 RBC ANTIBODY SCREEN: CPT

## 2022-10-25 PROCEDURE — 99222 1ST HOSP IP/OBS MODERATE 55: CPT | Performed by: OBSTETRICS & GYNECOLOGY

## 2022-10-25 PROCEDURE — 1220000000 HC SEMI PRIVATE OB R&B

## 2022-10-25 PROCEDURE — 86901 BLOOD TYPING SEROLOGIC RH(D): CPT

## 2022-10-25 PROCEDURE — 85025 COMPLETE CBC W/AUTO DIFF WBC: CPT

## 2022-10-25 PROCEDURE — 86592 SYPHILIS TEST NON-TREP QUAL: CPT

## 2022-10-25 PROCEDURE — 36415 COLL VENOUS BLD VENIPUNCTURE: CPT

## 2022-10-25 PROCEDURE — 80053 COMPREHEN METABOLIC PANEL: CPT

## 2022-10-25 PROCEDURE — 82570 ASSAY OF URINE CREATININE: CPT

## 2022-10-25 PROCEDURE — 84156 ASSAY OF PROTEIN URINE: CPT

## 2022-10-25 PROCEDURE — 86900 BLOOD TYPING SEROLOGIC ABO: CPT

## 2022-10-25 RX ORDER — ONDANSETRON 2 MG/ML
4 INJECTION INTRAMUSCULAR; INTRAVENOUS EVERY 6 HOURS PRN
Status: DISCONTINUED | OUTPATIENT
Start: 2022-10-25 | End: 2022-10-27 | Stop reason: HOSPADM

## 2022-10-25 RX ORDER — ACETAMINOPHEN 325 MG/1
650 TABLET ORAL EVERY 4 HOURS PRN
Status: DISCONTINUED | OUTPATIENT
Start: 2022-10-25 | End: 2022-10-27 | Stop reason: HOSPADM

## 2022-10-25 RX ORDER — BETAMETHASONE SODIUM PHOSPHATE AND BETAMETHASONE ACETATE 3; 3 MG/ML; MG/ML
12 INJECTION, SUSPENSION INTRA-ARTICULAR; INTRALESIONAL; INTRAMUSCULAR; SOFT TISSUE EVERY 24 HOURS
Status: COMPLETED | OUTPATIENT
Start: 2022-10-25 | End: 2022-10-26

## 2022-10-25 RX ORDER — ONDANSETRON 4 MG/1
4 TABLET, ORALLY DISINTEGRATING ORAL EVERY 8 HOURS PRN
Status: DISCONTINUED | OUTPATIENT
Start: 2022-10-25 | End: 2022-10-27 | Stop reason: HOSPADM

## 2022-10-25 RX ADMIN — Medication 12 MG: at 22:11

## 2022-10-26 VITALS
DIASTOLIC BLOOD PRESSURE: 77 MMHG | OXYGEN SATURATION: 99 % | TEMPERATURE: 98 F | HEIGHT: 65 IN | HEART RATE: 99 BPM | BODY MASS INDEX: 36.65 KG/M2 | RESPIRATION RATE: 18 BRPM | WEIGHT: 220 LBS | SYSTOLIC BLOOD PRESSURE: 134 MMHG

## 2022-10-26 LAB
24HR URINE VOLUME (ML): 1550 ML
CREATININE 24 HOUR URINE: 1.4 G/24HR (ref 1–2)
PROTEIN 24 HOUR URINE: 202 MG/24HR (ref 50–100)
RHIG LOT NUMBER: NORMAL

## 2022-10-26 PROCEDURE — 96361 HYDRATE IV INFUSION ADD-ON: CPT

## 2022-10-26 PROCEDURE — 6360000002 HC RX W HCPCS: Performed by: OBSTETRICS & GYNECOLOGY

## 2022-10-26 PROCEDURE — 84156 ASSAY OF PROTEIN URINE: CPT

## 2022-10-26 PROCEDURE — 96372 THER/PROPH/DIAG INJ SC/IM: CPT

## 2022-10-26 RX ADMIN — HUMAN RHO(D) IMMUNE GLOBULIN 300 MCG: 300 INJECTION, SOLUTION INTRAMUSCULAR at 06:34

## 2022-10-26 RX ADMIN — Medication 12 MG: at 21:14

## 2022-10-26 NOTE — FLOWSHEET NOTE
Pt arrived from home ambulatory with mother and sister at side for observation overnight per Dr Dagmar Menendez orders. Pt is to start a 24hour urine, labs to be drawn, Betamethasone and Rhogam to be administered. see orders for details. Will monitor.

## 2022-10-26 NOTE — FLOWSHEET NOTE
Pt resting in bed, eyes closed, request pt to allow this nurse to obtain b/p and adjust efm and toco. Pt sticks out arm to this nurse to obtain b/p, does not roll off of side/abdominal position to allow this nurse to adjust monitors, but covers herself back up to her head  with blanket once b/p is taken.

## 2022-10-26 NOTE — FLOWSHEET NOTE
Efm and toco removed for pt to leave unit for a short break, recommended 15 min at most, advised against smoking reminded pt that this is a non smoking facility. Pt verbalizes understanding.

## 2022-10-26 NOTE — FLOWSHEET NOTE
Spoke with Dr Lu Peña, notified of pts refusal to accept nicotine patch and states that she will leave if she is unable to have have smoke breaks. Dr Lu Peña states to allow pt to take her breaks, but to emphasize that she highly recommends against leaving the unit to go smoke and the dangers she is risking with her baby. This nurse will discuss with pt.

## 2022-10-26 NOTE — FLOWSHEET NOTE
Discuss with pt at great length that Dr María Gomes highly recommends against leaving the unit to go smoke. Risks to both pt and baby discussed at length, pt verbalizes understanding and states that \" I have to go smoke, I cant stay in here without going out to smoke. \" Request pt to allow this nurse to get a solid tracing of baby prior to leaving unit.  Pt states \" that's ok\"

## 2022-10-26 NOTE — FLOWSHEET NOTE
Pt back to bed from restroom, This nurse request pt permission to adjust efm and toco at this time, pt states \" I guess if you have to\". Obtain permission to administer Rhogam injection, Pt asks when she will get injection bec \" im just ready to go home\" Discuss with pt that Rhogam is ready in blood bank and this nurse can obtain it and administer it now. This nurse asks pt if she is wanting to sign out AMA after receiving Rhogam, pt states \"I dont know, I dont know why Im here, we arent doing anything, Im just laying here, not being able to sleep\" Discuss with pt that we are monitoring her BP, she is on continuous monitoring to ensure her baby is doing well, and that we are awaiting labs to be completed. Pt states \" I just want to go to sleep. EFM adjsuted by this nurse, will return with Rhogam injection.

## 2022-10-26 NOTE — FLOWSHEET NOTE
Offered pt a nicotine patch per Dr Cristy Colon orders. Pt refuses patch, states \" If I cant go smoke I'll just leave here- I dont want to be here anyways\" Discuss with pt that I will notify Dr Elie Hargrove of her request to be allowed to have smoke breaks.

## 2022-10-27 LAB
HCV QNT BY NAAT IU/ML: 864 IU/ML
HCV QNT BY NAAT LOG IU/ML: 2.94 LOG IU/ML
INTERPRETATION: DETECTED
RPR: NORMAL

## 2022-10-27 NOTE — DISCHARGE INSTRUCTIONS
LABOR AND DELIVERY - OBSERVATION DISCHARGE INSTRUCTIONS    TERM LABOR: RETURN TO HOSPITAL IF:  __There is a leaking or sudden gush of fluid from the vagina (note color, odor, time and amount of fluid)    __ You have bright red vaginal bleeding    __You begin to have regular contractions, occuring every 3-5 minutes, each contraction lasting 45-60 seconds for one hour. Time contractions from beginning of one to the beginning of the next. True contractions have a regular rate and become progressively closer. They are not changed by position change and are usually more uncomfortable when walking. PRE-TERM LABOR  _x_Your gestational age is 35.6 weeks: term pregnancy starts at 42 weeks. __Fill your prescription and take as directed. __Bed rest (left lying position is best). __Refrain from sexual intercourse until you see your physician again. __No heavy lifting or strenuous activity. RETURN TO HOSPITAL IF:  _x_Contractions occur 3-4 in any hour (every 10-15 minutes), maybe with or without pain. _x_Rhythmic or constant menstrual-like cramps  _x_Rhythmic or constant lower, dull backache may radiate to the sides or front. Increase or change in vaginal discharge, may be watery, pink, red or brown-tinged. PRE-ECLAMPSIA  __Bed rest, left side lying position  _x_Elevate legs while sitting  _x_Maintain quiet, peaceful environment  __xEat well-balanced meals, no added salt, avoid processed lunch meats, canned soups, potato chips, etc.    SYMPTOMS THAT REQUIRE PROMPT REPORTING:  _x_Rapid gain in weight  _x_Persistent or severe headache  _x_Visual disturbances (spots, blurred)  _x_Nausea/vomiting, with or without upper abdominal pain.   _x_Increase or persistent swelling (face puffiness, hands, legs, ankles)  _x_Decreased urinary output  _x_Drowsiness listlessness        OTHER INSTRUCTIONS  x__keep all regularly scheduled appointments    x__After today's visit, you will need to return to registration and pre register for your next visit     NOTE: If you do not begin to feel better, or you have any questions, contact your physician or call [phone], or return to the hospital. declines

## 2022-10-27 NOTE — FLOWSHEET NOTE
Spoke with Dr Kevin Ko notified of pts request to leave once 24hour urine is complete. Report stabel vs, reactive NST. Dr Kevin Ko states that she recommends pt wait until results of 24hour urine are received before leaving but that if pt does not want to wait she may sign out AMA.

## 2022-10-27 NOTE — FLOWSHEET NOTE
Pt requests to contact Dr Oracio Zarate to be able to leave once 24 hour urine is complete. States \" I cant stay here another night- Im ready to go\". Will notify physician. Pt aware 24hour urine will be complete at 2030.

## 2022-10-27 NOTE — FLOWSHEET NOTE
Dischare instructions read and reviewed with pt and mother. Time allowed for questions, none voiced. Pt dressed and left unit in stable condition ambulatory.

## 2022-11-14 ENCOUNTER — TELEPHONE (OUTPATIENT)
Dept: OBGYN CLINIC | Age: 32
End: 2022-11-14

## 2022-11-14 NOTE — TELEPHONE ENCOUNTER
Jerri from [address] [place] [address] called to schedule this patient's repeat c section. I scheduled it for 11-17-22 at 8am, arrive at Labor and Delivery at 6 am, nothing to eat or drink after 12 the night before. Cosmo Silva will relay these instructions to the patient.

## 2022-11-15 ENCOUNTER — ANESTHESIA EVENT (OUTPATIENT)
Dept: LABOR AND DELIVERY | Age: 32
End: 2022-11-15
Payer: MEDICAID

## 2022-11-15 NOTE — H&P
Albert Tabor is a 28 y.o. female patient who presents from [address] [place] [address] routine prenatal visit with gestational HTN for evaluation. Patient c/o headache and feeling \"bad\". No diagnosis found. Past Medical History:   Diagnosis Date    Bipolar 1 disorder ([address]     Gestational hypertension     Hepatitis C      OB History          3    Para   2    Term   2            AB        Living   2         SAB        IAB        Ectopic        Molar        Multiple        Live Births                  38w4d  Estimated Date of Delivery: 22  No Known Allergies  Principal Problem:    Hypertension affecting pregnancy in third trimester  Resolved Problems:    * No resolved hospital problems. *    Blood pressure 134/77, pulse 99, temperature 98 °F (36.7 °C), temperature source Bladder, resp. rate 18, height 5' 5\" (1.651 m), weight 220 lb (99.8 kg), SpO2 99 %. Maternal Medical History:   Fetal activity: Perceived fetal activity is normal.    Last perceived fetal movement was within the past hour. Prenatal complications: PIH. Prenatal Complications - Diabetes: none. Maternal Exam:   Abdomen: Patient reports no abdominal tenderness. Surgical scars: low transverse. Fetal presentation: vertex  Introitus: Normal vulva. Normal vagina. Fetal Exam  Fetal Monitor Review: Mode: ultrasound. Variability: moderate (6-25 bpm). Pattern: accelerations present and no decelerations. Fetal State Assessment: Category I - tracings are normal.    Assessment:  1. IUP at 36 wks  2. Gestational HTN  3. Prior CS    Plan:  1. Admit to L&D for PIH workup  2. Admission and [address] labs  3.   NST q shift  4.  24 hour urine protein collection    Lisa Brooks MD

## 2022-11-15 NOTE — DISCHARGE SUMMARY
Obstetric Discharge Summary    Admitting Diagnosis  Gestational HTN  OB History          3    Para   2    Term   2            AB        Living   2         SAB        IAB        Ectopic        Molar        Multiple        Live Births                    Reasons for Admission on 10/25/2022  8:21 PM  Hypertension affecting pregnancy in third trimester [O16.3]  No comment available  Observation/Evaluation (Medical Complications): Gestational HTN    Prenatal Procedures  None                 Discharge Diagnosis     Gestational HTN. BP stable during admission. Discharge Information  Discharge Medication List as of 10/26/2022  9:11 PM        CONTINUE these medications which have NOT CHANGED    Details   Prenatal Vit-Fe Fumarate-FA (PRENATAL VITAMINS PO) Take 1 tablet by mouth dailyHistorical Med      metoclopramide (REGLAN) 10 MG tablet Take 1 tablet by mouth 4 times daily, Disp-120 tablet, R-3Normal             No discharge procedures on file. Discharge to: Home  Follow up in 1 week at [facility].

## 2022-11-17 ENCOUNTER — HOSPITAL ENCOUNTER (INPATIENT)
Age: 32
LOS: 2 days | Discharge: HOME | End: 2022-11-19
Attending: OBSTETRICS & GYNECOLOGY | Admitting: OBSTETRICS & GYNECOLOGY
Payer: MEDICAID

## 2022-11-17 ENCOUNTER — ANESTHESIA (OUTPATIENT)
Dept: LABOR AND DELIVERY | Age: 32
End: 2022-11-17
Payer: MEDICAID

## 2022-11-17 DIAGNOSIS — Z98.891 S/P C-SECTION: Primary | ICD-10-CM

## 2022-11-17 PROBLEM — Z3A.39 39 WEEKS GESTATION OF PREGNANCY: Status: ACTIVE | Noted: 2022-11-17

## 2022-11-17 LAB
ABO/RH: NORMAL
ABO/RH: NORMAL
AMPHETAMINE SCREEN, URINE: NEGATIVE
ANTIBODY IDENTIFICATION: NORMAL
ANTIBODY SCREEN: NORMAL
BARBITURATE SCREEN URINE: NEGATIVE
BENZODIAZEPINE SCREEN, URINE: NEGATIVE
BUPRENORPHINE URINE: NEGATIVE
CANNABINOID SCREEN URINE: NEGATIVE
COCAINE METABOLITE SCREEN URINE: NEGATIVE
FETAL SCREEN: NORMAL
HCT VFR BLD CALC: 37.9 % (ref 37–47)
HEMOGLOBIN: 12.7 G/DL (ref 12–16)
Lab: NORMAL
MCH RBC QN AUTO: 28.6 PG (ref 27–31)
MCHC RBC AUTO-ENTMCNC: 33.5 G/DL (ref 33–37)
MCV RBC AUTO: 85.4 FL (ref 81–99)
METHADONE SCREEN, URINE: NEGATIVE
METHAMPHETAMINE, URINE: NEGATIVE
OPIATE SCREEN URINE: NEGATIVE
OXYCODONE URINE: NEGATIVE
PDW BLD-RTO: 13.1 % (ref 11.5–14.5)
PHENCYCLIDINE SCREEN URINE: NEGATIVE
PLATELET # BLD: 223 K/UL (ref 130–400)
PMV BLD AUTO: 10.4 FL (ref 9.4–12.3)
PROPOXYPHENE SCREEN: NEGATIVE
RBC # BLD: 4.44 M/UL (ref 4.2–5.4)
RPR: NORMAL
SARS-COV-2, NAAT: NOT DETECTED
TRICYCLIC, URINE: NEGATIVE
WBC # BLD: 8.9 K/UL (ref 4.8–10.8)

## 2022-11-17 PROCEDURE — 2500000003 HC RX 250 WO HCPCS: Performed by: NURSE ANESTHETIST, CERTIFIED REGISTERED

## 2022-11-17 PROCEDURE — 2580000003 HC RX 258: Performed by: OBSTETRICS & GYNECOLOGY

## 2022-11-17 PROCEDURE — 85461 HEMOGLOBIN FETAL: CPT

## 2022-11-17 PROCEDURE — 3700000001 HC ADD 15 MINUTES (ANESTHESIA): Performed by: OBSTETRICS & GYNECOLOGY

## 2022-11-17 PROCEDURE — 1220000000 HC SEMI PRIVATE OB R&B

## 2022-11-17 PROCEDURE — 86922 COMPATIBILITY TEST ANTIGLOB: CPT

## 2022-11-17 PROCEDURE — 80306 DRUG TEST PRSMV INSTRMNT: CPT

## 2022-11-17 PROCEDURE — 86850 RBC ANTIBODY SCREEN: CPT

## 2022-11-17 PROCEDURE — 87635 SARS-COV-2 COVID-19 AMP PRB: CPT

## 2022-11-17 PROCEDURE — 3700000000 HC ANESTHESIA ATTENDED CARE: Performed by: OBSTETRICS & GYNECOLOGY

## 2022-11-17 PROCEDURE — 2709999900 HC NON-CHARGEABLE SUPPLY: Performed by: OBSTETRICS & GYNECOLOGY

## 2022-11-17 PROCEDURE — 86901 BLOOD TYPING SEROLOGIC RH(D): CPT

## 2022-11-17 PROCEDURE — 86900 BLOOD TYPING SEROLOGIC ABO: CPT

## 2022-11-17 PROCEDURE — 36415 COLL VENOUS BLD VENIPUNCTURE: CPT

## 2022-11-17 PROCEDURE — 85027 COMPLETE CBC AUTOMATED: CPT

## 2022-11-17 PROCEDURE — 86870 RBC ANTIBODY IDENTIFICATION: CPT

## 2022-11-17 PROCEDURE — 7100000001 HC PACU RECOVERY - ADDTL 15 MIN: Performed by: OBSTETRICS & GYNECOLOGY

## 2022-11-17 PROCEDURE — 6370000000 HC RX 637 (ALT 250 FOR IP): Performed by: OBSTETRICS & GYNECOLOGY

## 2022-11-17 PROCEDURE — 6360000002 HC RX W HCPCS: Performed by: OBSTETRICS & GYNECOLOGY

## 2022-11-17 PROCEDURE — 86592 SYPHILIS TEST NON-TREP QUAL: CPT

## 2022-11-17 PROCEDURE — 59514 CESAREAN DELIVERY ONLY: CPT | Performed by: OBSTETRICS & GYNECOLOGY

## 2022-11-17 PROCEDURE — 3609079900 HC CESAREAN SECTION: Performed by: OBSTETRICS & GYNECOLOGY

## 2022-11-17 PROCEDURE — C1765 ADHESION BARRIER: HCPCS | Performed by: OBSTETRICS & GYNECOLOGY

## 2022-11-17 PROCEDURE — 6360000002 HC RX W HCPCS: Performed by: NURSE ANESTHETIST, CERTIFIED REGISTERED

## 2022-11-17 PROCEDURE — 7100000000 HC PACU RECOVERY - FIRST 15 MIN: Performed by: OBSTETRICS & GYNECOLOGY

## 2022-11-17 RX ORDER — ACETAMINOPHEN 500 MG
1000 TABLET ORAL EVERY 6 HOURS PRN
Status: DISCONTINUED | OUTPATIENT
Start: 2022-11-17 | End: 2022-11-19 | Stop reason: HOSPADM

## 2022-11-17 RX ORDER — CEFAZOLIN SODIUM 1 G/3ML
INJECTION, POWDER, FOR SOLUTION INTRAMUSCULAR; INTRAVENOUS PRN
Status: DISCONTINUED | OUTPATIENT
Start: 2022-11-17 | End: 2022-11-17 | Stop reason: SDUPTHER

## 2022-11-17 RX ORDER — TRISODIUM CITRATE DIHYDRATE AND CITRIC ACID MONOHYDRATE 500; 334 MG/5ML; MG/5ML
30 SOLUTION ORAL ONCE
Status: DISCONTINUED | OUTPATIENT
Start: 2022-11-17 | End: 2022-11-19 | Stop reason: HOSPADM

## 2022-11-17 RX ORDER — ONDANSETRON 2 MG/ML
4 INJECTION INTRAMUSCULAR; INTRAVENOUS EVERY 6 HOURS PRN
Status: DISCONTINUED | OUTPATIENT
Start: 2022-11-17 | End: 2022-11-19 | Stop reason: HOSPADM

## 2022-11-17 RX ORDER — SODIUM CHLORIDE, SODIUM LACTATE, POTASSIUM CHLORIDE, CALCIUM CHLORIDE 600; 310; 30; 20 MG/100ML; MG/100ML; MG/100ML; MG/100ML
INJECTION, SOLUTION INTRAVENOUS CONTINUOUS
Status: DISCONTINUED | OUTPATIENT
Start: 2022-11-17 | End: 2022-11-17

## 2022-11-17 RX ORDER — IBUPROFEN 400 MG/1
800 TABLET ORAL EVERY 8 HOURS PRN
Status: DISCONTINUED | OUTPATIENT
Start: 2022-11-17 | End: 2022-11-19 | Stop reason: HOSPADM

## 2022-11-17 RX ORDER — KETOROLAC TROMETHAMINE 30 MG/ML
30 INJECTION, SOLUTION INTRAMUSCULAR; INTRAVENOUS EVERY 6 HOURS
Status: DISCONTINUED | OUTPATIENT
Start: 2022-11-17 | End: 2022-11-17

## 2022-11-17 RX ORDER — KETOROLAC TROMETHAMINE 30 MG/ML
INJECTION, SOLUTION INTRAMUSCULAR; INTRAVENOUS PRN
Status: DISCONTINUED | OUTPATIENT
Start: 2022-11-17 | End: 2022-11-17 | Stop reason: SDUPTHER

## 2022-11-17 RX ORDER — LIDOCAINE HYDROCHLORIDE 10 MG/ML
INJECTION, SOLUTION INFILTRATION; PERINEURAL PRN
Status: DISCONTINUED | OUTPATIENT
Start: 2022-11-17 | End: 2022-11-17 | Stop reason: SDUPTHER

## 2022-11-17 RX ORDER — DOCUSATE SODIUM 100 MG/1
100 CAPSULE, LIQUID FILLED ORAL 2 TIMES DAILY
Status: DISCONTINUED | OUTPATIENT
Start: 2022-11-17 | End: 2022-11-19 | Stop reason: HOSPADM

## 2022-11-17 RX ORDER — SODIUM CHLORIDE 9 MG/ML
INJECTION, SOLUTION INTRAVENOUS PRN
Status: DISCONTINUED | OUTPATIENT
Start: 2022-11-17 | End: 2022-11-17

## 2022-11-17 RX ORDER — BUPIVACAINE HYDROCHLORIDE 7.5 MG/ML
INJECTION, SOLUTION INTRASPINAL PRN
Status: DISCONTINUED | OUTPATIENT
Start: 2022-11-17 | End: 2022-11-17 | Stop reason: SDUPTHER

## 2022-11-17 RX ORDER — OXYCODONE HYDROCHLORIDE 5 MG/1
10 TABLET ORAL EVERY 4 HOURS PRN
Status: DISCONTINUED | OUTPATIENT
Start: 2022-11-17 | End: 2022-11-19 | Stop reason: HOSPADM

## 2022-11-17 RX ORDER — SODIUM CHLORIDE 0.9 % (FLUSH) 0.9 %
10 SYRINGE (ML) INJECTION EVERY 12 HOURS SCHEDULED
Status: DISCONTINUED | OUTPATIENT
Start: 2022-11-17 | End: 2022-11-17

## 2022-11-17 RX ORDER — SODIUM CHLORIDE, SODIUM LACTATE, POTASSIUM CHLORIDE, AND CALCIUM CHLORIDE .6; .31; .03; .02 G/100ML; G/100ML; G/100ML; G/100ML
1000 INJECTION, SOLUTION INTRAVENOUS ONCE
Status: COMPLETED | OUTPATIENT
Start: 2022-11-17 | End: 2022-11-17

## 2022-11-17 RX ORDER — SODIUM CHLORIDE 0.9 % (FLUSH) 0.9 %
5-40 SYRINGE (ML) INJECTION PRN
Status: DISCONTINUED | OUTPATIENT
Start: 2022-11-17 | End: 2022-11-17

## 2022-11-17 RX ORDER — SODIUM CHLORIDE 0.9 % (FLUSH) 0.9 %
10 SYRINGE (ML) INJECTION PRN
Status: DISCONTINUED | OUTPATIENT
Start: 2022-11-17 | End: 2022-11-17

## 2022-11-17 RX ORDER — OXYCODONE HYDROCHLORIDE 5 MG/1
5 TABLET ORAL EVERY 4 HOURS PRN
Status: DISCONTINUED | OUTPATIENT
Start: 2022-11-17 | End: 2022-11-19 | Stop reason: HOSPADM

## 2022-11-17 RX ORDER — SODIUM CHLORIDE 0.9 % (FLUSH) 0.9 %
5-40 SYRINGE (ML) INJECTION EVERY 12 HOURS SCHEDULED
Status: DISCONTINUED | OUTPATIENT
Start: 2022-11-17 | End: 2022-11-17

## 2022-11-17 RX ORDER — METOCLOPRAMIDE HYDROCHLORIDE 5 MG/ML
INJECTION INTRAMUSCULAR; INTRAVENOUS PRN
Status: DISCONTINUED | OUTPATIENT
Start: 2022-11-17 | End: 2022-11-17 | Stop reason: SDUPTHER

## 2022-11-17 RX ORDER — ONDANSETRON 2 MG/ML
INJECTION INTRAMUSCULAR; INTRAVENOUS PRN
Status: DISCONTINUED | OUTPATIENT
Start: 2022-11-17 | End: 2022-11-17 | Stop reason: SDUPTHER

## 2022-11-17 RX ORDER — FAMOTIDINE 10 MG/ML
INJECTION, SOLUTION INTRAVENOUS PRN
Status: DISCONTINUED | OUTPATIENT
Start: 2022-11-17 | End: 2022-11-17 | Stop reason: SDUPTHER

## 2022-11-17 RX ADMIN — PHENYLEPHRINE HYDROCHLORIDE 100 MCG: 10 INJECTION INTRAVENOUS at 08:20

## 2022-11-17 RX ADMIN — ONDANSETRON 4 MG: 2 INJECTION INTRAMUSCULAR; INTRAVENOUS at 07:56

## 2022-11-17 RX ADMIN — ACETAMINOPHEN 1000 MG: 500 TABLET ORAL at 15:04

## 2022-11-17 RX ADMIN — FAMOTIDINE 20 MG: 10 INJECTION, SOLUTION INTRAVENOUS at 07:56

## 2022-11-17 RX ADMIN — Medication 909 ML/HR: at 08:27

## 2022-11-17 RX ADMIN — SODIUM CHLORIDE, SODIUM LACTATE, POTASSIUM CHLORIDE, AND CALCIUM CHLORIDE: 600; 310; 30; 20 INJECTION, SOLUTION INTRAVENOUS at 08:41

## 2022-11-17 RX ADMIN — ACETAMINOPHEN 1000 MG: 500 TABLET ORAL at 09:28

## 2022-11-17 RX ADMIN — SODIUM CHLORIDE, POTASSIUM CHLORIDE, SODIUM LACTATE AND CALCIUM CHLORIDE 1000 ML: 600; 310; 30; 20 INJECTION, SOLUTION INTRAVENOUS at 06:13

## 2022-11-17 RX ADMIN — CEFAZOLIN SODIUM 2 G: 1 INJECTION, POWDER, FOR SOLUTION INTRAMUSCULAR; INTRAVENOUS at 08:04

## 2022-11-17 RX ADMIN — BUPIVACAINE HYDROCHLORIDE IN DEXTROSE 1.6 ML: 7.5 INJECTION, SOLUTION SUBARACHNOID at 08:02

## 2022-11-17 RX ADMIN — PHENYLEPHRINE HYDROCHLORIDE 100 MCG: 10 INJECTION INTRAVENOUS at 08:28

## 2022-11-17 RX ADMIN — IBUPROFEN 800 MG: 400 TABLET, FILM COATED ORAL at 21:07

## 2022-11-17 RX ADMIN — OXYCODONE 10 MG: 5 TABLET ORAL at 09:28

## 2022-11-17 RX ADMIN — DOCUSATE SODIUM 100 MG: 100 CAPSULE, LIQUID FILLED ORAL at 21:06

## 2022-11-17 RX ADMIN — METOCLOPRAMIDE 10 MG: 5 INJECTION, SOLUTION INTRAMUSCULAR; INTRAVENOUS at 07:56

## 2022-11-17 RX ADMIN — ONDANSETRON 4 MG: 2 INJECTION INTRAMUSCULAR; INTRAVENOUS at 08:41

## 2022-11-17 RX ADMIN — LIDOCAINE HYDROCHLORIDE 20 MG: 10 INJECTION, SOLUTION INFILTRATION; PERINEURAL at 07:59

## 2022-11-17 RX ADMIN — OXYCODONE 10 MG: 5 TABLET ORAL at 21:07

## 2022-11-17 RX ADMIN — KETOROLAC TROMETHAMINE 30 MG: 30 INJECTION, SOLUTION INTRAMUSCULAR; INTRAVENOUS at 15:03

## 2022-11-17 RX ADMIN — PHENYLEPHRINE HYDROCHLORIDE 100 MCG: 10 INJECTION INTRAVENOUS at 08:41

## 2022-11-17 RX ADMIN — KETOROLAC TROMETHAMINE 30 MG: 30 INJECTION, SOLUTION INTRAMUSCULAR; INTRAVENOUS at 08:56

## 2022-11-17 RX ADMIN — ONDANSETRON 4 MG: 2 INJECTION INTRAMUSCULAR; INTRAVENOUS at 06:11

## 2022-11-17 RX ADMIN — KETOROLAC TROMETHAMINE 30 MG: 30 INJECTION, SOLUTION INTRAMUSCULAR; INTRAVENOUS at 08:58

## 2022-11-17 RX ADMIN — SODIUM CHLORIDE, SODIUM LACTATE, POTASSIUM CHLORIDE, AND CALCIUM CHLORIDE: 600; 310; 30; 20 INJECTION, SOLUTION INTRAVENOUS at 07:04

## 2022-11-17 RX ADMIN — SODIUM CHLORIDE, POTASSIUM CHLORIDE, SODIUM LACTATE AND CALCIUM CHLORIDE: 600; 310; 30; 20 INJECTION, SOLUTION INTRAVENOUS at 12:15

## 2022-11-17 RX ADMIN — OXYCODONE 10 MG: 5 TABLET ORAL at 13:24

## 2022-11-17 ASSESSMENT — PAIN DESCRIPTION - DESCRIPTORS
DESCRIPTORS: SORE
DESCRIPTORS: CRAMPING
DESCRIPTORS: SORE
DESCRIPTORS: DISCOMFORT;ACHING;BURNING

## 2022-11-17 ASSESSMENT — PAIN - FUNCTIONAL ASSESSMENT
PAIN_FUNCTIONAL_ASSESSMENT: PREVENTS OR INTERFERES SOME ACTIVE ACTIVITIES AND ADLS
PAIN_FUNCTIONAL_ASSESSMENT: ACTIVITIES ARE NOT PREVENTED
PAIN_FUNCTIONAL_ASSESSMENT: PREVENTS OR INTERFERES WITH MANY ACTIVE NOT PASSIVE ACTIVITIES
PAIN_FUNCTIONAL_ASSESSMENT: PREVENTS OR INTERFERES WITH MANY ACTIVE NOT PASSIVE ACTIVITIES

## 2022-11-17 ASSESSMENT — PAIN SCALES - GENERAL
PAINLEVEL_OUTOF10: 8
PAINLEVEL_OUTOF10: 7
PAINLEVEL_OUTOF10: 4
PAINLEVEL_OUTOF10: 0
PAINLEVEL_OUTOF10: 7

## 2022-11-17 ASSESSMENT — PAIN DESCRIPTION - LOCATION
LOCATION: INCISION
LOCATION: ABDOMEN

## 2022-11-17 ASSESSMENT — PAIN DESCRIPTION - ORIENTATION
ORIENTATION: LOWER

## 2022-11-17 NOTE — FLOWSHEET NOTE
Pt arrives to the unit for her scheduled repeat  section. Pt reports + fetal movement, and denies bleeding and LOF. Pt admitted to [facility] unit and prepared for surgery.

## 2022-11-17 NOTE — FLOWSHEET NOTE
IV removed per patient request, pt states she would like to go outside, pt states she understands that IV Toradol will be changed to ibuprofen by mouth, and she does not want a nicotine patch

## 2022-11-17 NOTE — OP NOTE
Department of Obstetrics and Gynecology   Section Note    Indications:  Prior CS x 2    Pre-operative Diagnosis:   1. IUP at 39 wks  2. Prior CS x 2    Post-operative Diagnosis: Same    Surgeon: Christina Chong MD     Assistants: Huong Talbot    Anesthesia: Spinal anesthesia    Procedure Details   The patient was seen in the Holding Room. The risks, benefits, complications, treatment options, and expected outcomes were discussed with the patient. The patient concurred with the proposed plan, giving informed consent. The site of surgery properly noted/marked. The patient was taken to the Operating Room  identified as Luann Asa and the procedure verified as  Delivery. A Time Out was held and the above information confirmed. After induction of anesthesia, the patient was draped and prepped in the usual sterile manner. A Pfannenstiel incision was made and carried down through the subcutaneous tissue to the fascia. Fascial incision was made and extended transversely. The fascia was  from the underlying rectus tissue superiorly and inferiorly. The peritoneum was identified and entered. Peritoneal incision was extended longitudinally. Omental adhesions were encountered and dissected. The largest area was clamped with Evelyn's x 2, suture ligated and transected, allowing for visualization of the uterus. A low transverse uterine incision was made. A copious amount of clear fluid was noted. Delivered from MELINDA presentation was a female with Apgar scores of 8 at one minute and 9 at five minutes. Nuchal cord x 1 was reduced. After the umbilical cord was clamped and cut cord blood was obtained for evaluation. The placenta was removed intact and appeared normal. The uterine outline, tubes and ovaries appeared normal. The uterus was exteriorized and incision was closed with running locked sutures of 0 Vicryl.  A second imbricating layer of the same suture was placed with excellent Hemostasis obtained. Posterior and paracolic gutters were cleared of all clots and debris. The uterus was returned to the abdomen and Interceed placed over the incision. Peritoneum was closed using 2-0 vicryl in a running fashion. The fascia was then reapproximated with running sutures of 0 Vicryl. The skin was reapproximated with 3-0 monocryl in a subcuticular fashion. Instrument, sponge, and needle counts were correct prior the abdominal closure and at the conclusion of the case. Findings:  Omental-peritoneal adhesions. Normal uterus, tubes and ovaries. Intact placenta with 3-vessel cord. Intake/Output:     Date 11/16/22 0701 - 11/17/22 0700(Not Admitted) 11/17/22 0701 - 11/18/22 0700   Shift 5905-4050 [id number] 24 Hour Total 5850-8319 [id number] 24 Hour Total   INTAKE   I.V.    1000  1000   Shift Total    1000  1000   OUTPUT   Shift Total         NET    1000  1000    mL         Drains: Lucas                Specimens: Cord segment, Cord blood           Implants: Interceed           Complications:  none         Disposition: PACU - hemodynamically stable.            Condition: infant stable to general nursery

## 2022-11-17 NOTE — ANESTHESIA PRE PROCEDURE
Department of Anesthesiology  Preprocedure Note       Name:  Jeffrey Gómez   Age:  28 y.o.  :  1990                                          MRN:  [id number]         Date:  2022      Surgeon: Anthony Almeida):  Drew Guerin MD    Procedure: Procedure(s):   SECTION    Medications prior to admission:   Prior to Admission medications    Medication Sig Start Date End Date Taking?  Authorizing Provider   Prenatal Vit-Fe Fumarate-FA (PRENATAL VITAMINS PO) Take 1 tablet by mouth daily    Historical Provider, MD       Current medications:    Current Facility-Administered Medications   Medication Dose Route Frequency Provider Last Rate Last Admin    lactated ringers infusion   IntraVENous Continuous Drew Guerin  mL/hr at 22 0704 New Bag at 22 0704    sodium chloride flush 0.9 % injection 10 mL  10 mL IntraVENous 2 times per day Drew Guerin MD        sodium chloride flush 0.9 % injection 10 mL  10 mL IntraVENous PRN Drew Guerin MD        0.9 % sodium chloride infusion   IntraVENous PRN Drew Guerin MD        citric acid-sodium citrate (BICITRA) solution 30 mL  30 mL Oral Once Drew Guerin MD        ondansetron [facility]) injection 4 mg  4 mg IntraVENous Q6H PRN Drew Guerin MD   4 mg at 22       Allergies:  No Known Allergies    Problem List:    Patient Active Problem List   Diagnosis Code    Emesis R11.10    Hypertension affecting pregnancy in third trimester O16.3    39 weeks gestation of pregnancy Z3A.39       Past Medical History:        Diagnosis Date    Bipolar 1 disorder ([facility] [address]     Gestational hypertension     Hepatitis C        Past Surgical History:        Procedure Laterality Date    ABDOMEN SURGERY      CHOLECYSTECTOMY         Social History:    Social History     Tobacco Use    Smoking status: Every Day     Packs/day: 1.00     Years: 16.00     Pack years: 16.00     Types: Cigarettes    Smokeless tobacco: Never   Substance Use Topics    Alcohol use: Not Currently                                Ready to quit: Not Answered  Counseling given: Not Answered      Vital Signs (Current):   Vitals:    11/17/22 0556 11/17/22 0625 11/17/22 0720   BP:  125/73 126/79   Pulse: (!) 117 86 86   Resp: 18     Temp: 96.9 °F (36.1 °C)     TempSrc: Temporal     SpO2:  96%    Weight: 220 lb (99.8 kg)     Height: 5' 5\" (1.651 m)                                                BP Readings from Last 3 Encounters:   11/17/22 126/79   10/26/22 134/77   09/09/22 116/61       NPO Status:                                                                                 BMI:   Wt Readings from Last 3 Encounters:   11/17/22 220 lb (99.8 kg)   10/25/22 220 lb (99.8 kg)     Body mass index is 36.61 kg/m². CBC:   Lab Results   Component Value Date/Time    WBC 8.9 11/17/2022 05:54 AM    RBC 4.44 11/17/2022 05:54 AM    HGB 12.7 11/17/2022 05:54 AM    HCT 37.9 11/17/2022 05:54 AM    MCV 85.4 11/17/2022 05:54 AM    RDW 13.1 11/17/2022 05:54 AM     11/17/2022 05:54 AM       CMP:   Lab Results   Component Value Date/Time     10/25/2022 09:13 PM    K 3.9 10/25/2022 09:13 PM    K 3.7 09/09/2022 12:20 PM     10/25/2022 09:13 PM    CO2 24 10/25/2022 09:13 PM    BUN 11 10/25/2022 09:13 PM    CREATININE 0.6 10/25/2022 09:13 PM    GFRAA >59 09/09/2022 12:20 PM    LABGLOM >60 10/25/2022 09:13 PM    GLUCOSE 105 10/25/2022 09:13 PM    PROT 6.6 10/25/2022 09:13 PM    CALCIUM 9.4 10/25/2022 09:13 PM    BILITOT <0.2 10/25/2022 09:13 PM    ALKPHOS 144 10/25/2022 09:13 PM    AST 44 10/25/2022 09:13 PM    ALT 57 10/25/2022 09:13 PM       POC Tests: No results for input(s): POCGLU, POCNA, POCK, POCCL, POCBUN, POCHEMO, POCHCT in the last 72 hours.     Coags: No results found for: PROTIME, INR, APTT    HCG (If Applicable):   Lab Results   Component Value Date    PREGTESTUR NEGATIVE 05/24/2014        ABGs: No results found for: PHART, PO2ART, CGF2NDU, HIO6HXC, BEART, Z7CMYGTW     Type & Screen (If Applicable):  No results found for: LABABO, LABRH    Drug/Infectious Status (If Applicable):  No results found for: HIV, HEPCAB    COVID-19 Screening (If Applicable):   Lab Results   Component Value Date/Time    COVID19 Not Detected 11/17/2022 05:52 AM    COVID19 Not Detected 09/09/2022 01:36 PM           Anesthesia Evaluation  Patient summary reviewed and Nursing notes reviewed  Airway: Mallampati: II  TM distance: >3 FB   Neck ROM: full  Mouth opening: > = 3 FB   Dental: normal exam         Pulmonary:Negative Pulmonary ROS                              Cardiovascular:Negative CV ROS                      Neuro/Psych:   Negative Neuro/Psych ROS              GI/Hepatic/Renal: Neg GI/Hepatic/Renal ROS            Endo/Other: Negative Endo/Other ROS                    Abdominal:             Vascular: negative vascular ROS. Other Findings:           Anesthesia Plan      spinal     ASA 2           MIPS: Prophylactic antiemetics administered. Anesthetic plan and risks discussed with patient. Plan discussed with CRNA.                     LUIZ Fowler - CRNA   11/17/2022

## 2022-11-17 NOTE — ANESTHESIA POSTPROCEDURE EVALUATION
Department of Anesthesiology  Postprocedure Note    Patient: Cookie Yang  MRN: [id number]  [place]: 1990  Date of evaluation: 2022      Procedure Summary     Date: 22 Room / Location: [facility]    Anesthesia Start: 5650 Anesthesia Stop: 902    Procedure:  SECTION (Abdomen) Diagnosis:       Delivery by  section of full-term infant      (Delivery by  section of full-term infant [O82])    Surgeons: Leoncio Rowland MD Responsible Provider: LUIZ Redmond CRNA    Anesthesia Type: spinal ASA Status: 2          Anesthesia Type: No value filed.     Vielka Phase I:      Vielka Phase II:        Anesthesia Post Evaluation    Patient location during evaluation: PACU  Patient participation: complete - patient participated  Level of consciousness: awake and alert  Pain score: 0  Airway patency: patent  Nausea & Vomiting: no nausea and no vomiting  Complications: no  Cardiovascular status: hemodynamically stable  Respiratory status: acceptable  Hydration status: stable

## 2022-11-17 NOTE — FLOWSHEET NOTE
Pt's mother to nurses station, states \"Why can she not get up and walk outside? She needs to go outside\" RN to bedside, pt not requesting to go off the unit at this time, pt's mother again states \"Why can't she go outside? \" RN explained that patient had abdominal surgery this morning, she is still receiving IV fluids, has a lopez cath in place and walking outside at this time would possibly be quite painful to the patient. Pt agrees with RN, and request abd to be removed due to it causing discomfort.  Saul Fish removed per pt request.

## 2022-11-17 NOTE — H&P
Romeo Sierra is a 28 y.o. female patient presenting at 44 wks for RLTCS. No diagnosis found. Past Medical History:   Diagnosis Date    Bipolar 1 disorder ([address]     Gestational hypertension     Hepatitis C      OB History          3    Para   2    Term   2            AB        Living   2         SAB        IAB        Ectopic        Molar        Multiple        Live Births                  39w0d  Estimated Date of Delivery: 22  No Known Allergies  Principal Problem:    39 weeks gestation of pregnancy  Resolved Problems:    * No resolved hospital problems. *    Blood pressure 126/79, pulse 86, temperature 96.9 °F (36.1 °C), temperature source Temporal, resp. rate 18, height 5' 5\" (1.651 m), weight 220 lb (99.8 kg), SpO2 96 %. Maternal Medical History:   Fetal activity: Perceived fetal activity is normal.    Prenatal complications: PIH. Prenatal Complications - Diabetes: none. Maternal Exam:   Abdomen: Patient reports no abdominal tenderness. Surgical scars: low transverse. Fetal presentation: vertex  Introitus: Normal vulva. Normal vagina. Fetal Exam  Fetal Monitor Review: Mode: ultrasound. Variability: moderate (6-25 bpm). Pattern: accelerations present and no decelerations. Fetal State Assessment: Category I - tracings are normal.    Assessment:  1. IUP at 39 wks  2. History of CS    Plan:  1. Admit for delivery  2. Routine admission labs  3. Prep for surgery  4.   Anesthesia consulted    Cori Little MD  2022

## 2022-11-17 NOTE — FLOWSHEET NOTE
Pt's mother back to nurses station, states \"Can you call the doctor I think she needs more pain medicine so she can hold the baby\"

## 2022-11-17 NOTE — CARE COORDINATION
Date / Time of Evaluation: 2022 2:43 PM  Assessment Completed by: An Zelaya    Patient Admission Status:     Venecia Cordova     [phone] (home)   Telephone Information:   Mobile [phone]       (Best Practice:  Have patient / caregiver verify above address and phone number by stating out loud their current address and reachable phone number.)  Is above information correct? yes      Current PCP:  Dr Fani Cook - Dr Dao Shore    Initial Assessment Completed at bedside with:  , female : 2022 Kassandra Jo and Broderick Glover, Sister to pt        Emergency Contacts:  Extended Emergency Contact Information  Primary Emergency Contact: [facility] Phone: [phone]  Mobile Phone: [phone]  Relation: Parent   needed? No    Advance Directives: Code Status:  Full Code    Financial:  Payor: Cheri Evans / Plan: Juancarlos Bond / Product Type: *No Product type* /       Pharmacy:   [facility] #[id number], [address] [phone] Illa Delay [phone]  [address]  [address] [facility] [address]  Phone: [phone] Fax: [phone]      Potential assistance purchasing medications? Reports medicaid covers with affordable copays    ADLS:  Support System:  Parent    Current Home Environment:  Mom, , step dad of pt Liza Almaguer. Denies anyone else in the home. Pt denies FOB is present and will not have any exposure to . Pt denies any legal relationship with the FOB. Reports 2 previous children who reside with paternal grandmother; reports children as: Isac Thornton : 2010 Karen Bolivar : 2016 and mom reports having custody of those children with no restrictions at this time.        Additional CM/SW Notes: Pt reports having all care needs for the , receives SSI, [facility], denies other financial asst or programs in the home. Pt already [facility] with [facility] in [facility]. Pt UDS upon admission was negative but has history of previous +UDS for methamphetamine, THC and Amphetamines. [place] cord tissue and meconium were collected for further testing and wee bag attempted for  UDS. Discussed mandatory reporting for  screen results to [facility]. Pt verbalized understanding and states used in early pregnancy but stopped and intends to continue treatments through [facility]. Pt denies any questions at this time and SW provided her contact information for any further assistance as necessary. Reported  delivery to [facility] with expected + results from meconium and/or cord tissue of , as well as, previous +UDS for Mom during pregnancy for methamphetamines, amphetamines and THC. Intake ID# [id number].     [address]  Care Management Department  Ph: [phone]   Fax: [phone]

## 2022-11-18 LAB
BASOPHILS ABSOLUTE: 0 K/UL (ref 0–0.2)
BASOPHILS RELATIVE PERCENT: 0.3 % (ref 0–1)
EOSINOPHILS ABSOLUTE: 0.2 K/UL (ref 0–0.6)
EOSINOPHILS RELATIVE PERCENT: 1.7 % (ref 0–5)
HCT VFR BLD CALC: 29.7 % (ref 37–47)
HEMOGLOBIN: 9.7 G/DL (ref 12–16)
IMMATURE GRANULOCYTES #: 0 K/UL
LYMPHOCYTES ABSOLUTE: 2.9 K/UL (ref 1.1–4.5)
LYMPHOCYTES RELATIVE PERCENT: 28.3 % (ref 20–40)
MCH RBC QN AUTO: 28.8 PG (ref 27–31)
MCHC RBC AUTO-ENTMCNC: 32.7 G/DL (ref 33–37)
MCV RBC AUTO: 88.1 FL (ref 81–99)
MONOCYTES ABSOLUTE: 0.8 K/UL (ref 0–0.9)
MONOCYTES RELATIVE PERCENT: 7.5 % (ref 0–10)
NEUTROPHILS ABSOLUTE: 6.4 K/UL (ref 1.5–7.5)
NEUTROPHILS RELATIVE PERCENT: 61.8 % (ref 50–65)
PDW BLD-RTO: 13.3 % (ref 11.5–14.5)
PLATELET # BLD: 173 K/UL (ref 130–400)
PMV BLD AUTO: 10.9 FL (ref 9.4–12.3)
RBC # BLD: 3.37 M/UL (ref 4.2–5.4)
WBC # BLD: 10.3 K/UL (ref 4.8–10.8)

## 2022-11-18 PROCEDURE — 1220000000 HC SEMI PRIVATE OB R&B

## 2022-11-18 PROCEDURE — 6370000000 HC RX 637 (ALT 250 FOR IP): Performed by: NURSE PRACTITIONER

## 2022-11-18 PROCEDURE — 6370000000 HC RX 637 (ALT 250 FOR IP): Performed by: OBSTETRICS & GYNECOLOGY

## 2022-11-18 PROCEDURE — 85025 COMPLETE CBC W/AUTO DIFF WBC: CPT

## 2022-11-18 PROCEDURE — 36415 COLL VENOUS BLD VENIPUNCTURE: CPT

## 2022-11-18 PROCEDURE — 99232 SBSQ HOSP IP/OBS MODERATE 35: CPT | Performed by: NURSE PRACTITIONER

## 2022-11-18 RX ORDER — BISACODYL 10 MG
10 SUPPOSITORY, RECTAL RECTAL DAILY PRN
Status: DISCONTINUED | OUTPATIENT
Start: 2022-11-18 | End: 2022-11-19 | Stop reason: HOSPADM

## 2022-11-18 RX ORDER — UREA 10 %
140 LOTION (ML) TOPICAL 2 TIMES DAILY WITH MEALS
Status: DISCONTINUED | OUTPATIENT
Start: 2022-11-18 | End: 2022-11-19 | Stop reason: HOSPADM

## 2022-11-18 RX ADMIN — BISACODYL 10 MG: 10 SUPPOSITORY RECTAL at 23:46

## 2022-11-18 RX ADMIN — OXYCODONE 10 MG: 5 TABLET ORAL at 01:04

## 2022-11-18 RX ADMIN — IBUPROFEN 800 MG: 400 TABLET, FILM COATED ORAL at 14:39

## 2022-11-18 RX ADMIN — OXYCODONE 10 MG: 5 TABLET ORAL at 23:37

## 2022-11-18 RX ADMIN — DOCUSATE SODIUM 100 MG: 100 CAPSULE, LIQUID FILLED ORAL at 19:39

## 2022-11-18 RX ADMIN — OXYCODONE 10 MG: 5 TABLET ORAL at 11:51

## 2022-11-18 RX ADMIN — ACETAMINOPHEN 1000 MG: 500 TABLET ORAL at 23:41

## 2022-11-18 RX ADMIN — IBUPROFEN 800 MG: 400 TABLET, FILM COATED ORAL at 05:19

## 2022-11-18 RX ADMIN — Medication 140 MG: at 11:50

## 2022-11-18 RX ADMIN — ACETAMINOPHEN 1000 MG: 500 TABLET ORAL at 14:39

## 2022-11-18 RX ADMIN — OXYCODONE 10 MG: 5 TABLET ORAL at 05:20

## 2022-11-18 RX ADMIN — DOCUSATE SODIUM 100 MG: 100 CAPSULE, LIQUID FILLED ORAL at 08:18

## 2022-11-18 RX ADMIN — OXYCODONE 10 MG: 5 TABLET ORAL at 17:51

## 2022-11-18 ASSESSMENT — PAIN DESCRIPTION - LOCATION
LOCATION: ABDOMEN
LOCATION: ABDOMEN;LEG
LOCATION: ABDOMEN;INCISION
LOCATION: ABDOMEN
LOCATION: ABDOMEN
LOCATION: INCISION

## 2022-11-18 ASSESSMENT — PAIN DESCRIPTION - DESCRIPTORS
DESCRIPTORS: CRAMPING
DESCRIPTORS: CRAMPING;DISCOMFORT;ACHING
DESCRIPTORS: PRESSURE;CRAMPING
DESCRIPTORS: SHARP;PRESSURE
DESCRIPTORS: DISCOMFORT;CRAMPING;ACHING
DESCRIPTORS: ACHING

## 2022-11-18 ASSESSMENT — PAIN DESCRIPTION - ORIENTATION
ORIENTATION: LOWER
ORIENTATION: LEFT;RIGHT;LOWER

## 2022-11-18 ASSESSMENT — PAIN SCALES - GENERAL
PAINLEVEL_OUTOF10: 5
PAINLEVEL_OUTOF10: 6
PAINLEVEL_OUTOF10: 5
PAINLEVEL_OUTOF10: 5
PAINLEVEL_OUTOF10: 6
PAINLEVEL_OUTOF10: 8

## 2022-11-18 ASSESSMENT — PAIN - FUNCTIONAL ASSESSMENT
PAIN_FUNCTIONAL_ASSESSMENT: ACTIVITIES ARE NOT PREVENTED

## 2022-11-18 NOTE — PROGRESS NOTES
Postpartum Day 1:  Delivery    Patient is a [id number] that delivered on The patient feels well. The patient denies emotional concerns. Pain is well controlled with current medications. The baby iswell. Baby is feeding via bottle - Similac with iron. Urinary output is adequate. The patient is ambulating well. The patient is tolerating a normal diet. Flatus has been passed. Objective:      Patient Vitals for the past 8 hrs:  Vitals:    22 0600   BP: 117/77   Pulse: 76   Resp: 14   Temp: 97.6 °F (36.4 °C)   SpO2: 100%       General:    alert, appears stated age, and cooperative   Bowel Sounds:  active   Lochia:  appropriate   Uterine Fundus:   firm   Incision:  healing well, no significant drainage, no dehiscence, no significant erythema   DVT Evaluation:  No evidence of DVT seen on physical exam.     Lab Results   Component Value Date    WBC 10.3 2022    HGB 9.7 (L) 2022    HCT 29.7 (L) 2022    MCV 88.1 2022     2022     Assessment:     Status post  section. Doing well postoperatively. Plan:     Continue current care. Start po iron.

## 2022-11-18 NOTE — FLOWSHEET NOTE
Grandmother is leaving at this time and pt is requesting to bring baby into nursery so that she can go out and smoke. This nurse applied Nicoderm patch at [address] [place] [address] and educated pt that she is unable to smoke with patch in place. Pt requested that patch be removed. This nurse removed patch at 5915. Baby is now in nursery and pt is out to smoke.

## 2022-11-19 VITALS
WEIGHT: 220 LBS | BODY MASS INDEX: 36.65 KG/M2 | RESPIRATION RATE: 16 BRPM | SYSTOLIC BLOOD PRESSURE: 119 MMHG | HEIGHT: 65 IN | DIASTOLIC BLOOD PRESSURE: 75 MMHG | HEART RATE: 97 BPM | TEMPERATURE: 97.3 F | OXYGEN SATURATION: 96 %

## 2022-11-19 PROBLEM — Z3A.39 39 WEEKS GESTATION OF PREGNANCY: Status: RESOLVED | Noted: 2022-11-17 | Resolved: 2022-11-19

## 2022-11-19 PROBLEM — O16.3 HYPERTENSION AFFECTING PREGNANCY IN THIRD TRIMESTER: Status: RESOLVED | Noted: 2022-10-25 | Resolved: 2022-11-19

## 2022-11-19 PROBLEM — R11.10 EMESIS: Status: RESOLVED | Noted: 2022-09-09 | Resolved: 2022-11-19

## 2022-11-19 LAB — RHIG LOT NUMBER: NORMAL

## 2022-11-19 PROCEDURE — 6370000000 HC RX 637 (ALT 250 FOR IP): Performed by: OBSTETRICS & GYNECOLOGY

## 2022-11-19 PROCEDURE — 6370000000 HC RX 637 (ALT 250 FOR IP): Performed by: NURSE PRACTITIONER

## 2022-11-19 PROCEDURE — 96372 THER/PROPH/DIAG INJ SC/IM: CPT

## 2022-11-19 PROCEDURE — 6360000002 HC RX W HCPCS: Performed by: OBSTETRICS & GYNECOLOGY

## 2022-11-19 RX ORDER — IBUPROFEN 800 MG/1
800 TABLET ORAL 2 TIMES DAILY PRN
Qty: 60 TABLET | Refills: 0 | Status: SHIPPED | OUTPATIENT
Start: 2022-11-19

## 2022-11-19 RX ORDER — OXYCODONE HYDROCHLORIDE AND ACETAMINOPHEN 5; 325 MG/1; MG/1
1 TABLET ORAL EVERY 6 HOURS PRN
Qty: 20 TABLET | Refills: 0 | Status: SHIPPED | OUTPATIENT
Start: 2022-11-19 | End: 2022-11-24

## 2022-11-19 RX ADMIN — IBUPROFEN 800 MG: 400 TABLET, FILM COATED ORAL at 03:49

## 2022-11-19 RX ADMIN — OXYCODONE 10 MG: 5 TABLET ORAL at 03:45

## 2022-11-19 RX ADMIN — ACETAMINOPHEN 1000 MG: 500 TABLET ORAL at 06:18

## 2022-11-19 RX ADMIN — DOCUSATE SODIUM 100 MG: 100 CAPSULE, LIQUID FILLED ORAL at 09:21

## 2022-11-19 RX ADMIN — OXYCODONE 10 MG: 5 TABLET ORAL at 09:22

## 2022-11-19 RX ADMIN — HUMAN RHO(D) IMMUNE GLOBULIN 300 MCG: 300 INJECTION, SOLUTION INTRAMUSCULAR at 10:46

## 2022-11-19 RX ADMIN — Medication 140 MG: at 09:22

## 2022-11-19 ASSESSMENT — PAIN DESCRIPTION - ORIENTATION
ORIENTATION: LOWER

## 2022-11-19 ASSESSMENT — PAIN DESCRIPTION - DESCRIPTORS
DESCRIPTORS: CRAMPING
DESCRIPTORS: ACHING
DESCRIPTORS: SHARP

## 2022-11-19 ASSESSMENT — PAIN SCALES - GENERAL
PAINLEVEL_OUTOF10: 1
PAINLEVEL_OUTOF10: 1
PAINLEVEL_OUTOF10: 5
PAINLEVEL_OUTOF10: 8
PAINLEVEL_OUTOF10: 5

## 2022-11-19 ASSESSMENT — PAIN - FUNCTIONAL ASSESSMENT
PAIN_FUNCTIONAL_ASSESSMENT: ACTIVITIES ARE NOT PREVENTED

## 2022-11-19 ASSESSMENT — PAIN DESCRIPTION - LOCATION
LOCATION: ABDOMEN

## 2022-11-19 NOTE — FLOWSHEET NOTE
Abdominal incision dressing removed after pt showers. Incision site dry, clear and well approximated.

## 2022-11-19 NOTE — DISCHARGE INSTRUCTIONS
POSTPARTUM EDUCATION / DISCHARGE PLANNING    Call Doctor:  1. If temp 100.4 or greater. 2. If foul smelling discharge. 3. If discharge changes from pink or yellow, back to red, and bleeding is heavier than a normal period. 4. If you pass large clots. 5. If abdominal incision starts to separate and/or starts to bleeding, is red and warm to touch or has drainage with a foul odor. 6. Report any pain, redness, or warmth of skin in calf of leg which could indicate a blood clot. Crampin. Cramping is normal; uterus is returning to pre-pregnant state. 2. Moms of twins and mothers of more than one child and breast-feeding mothers will cramp more than first time moms. 3. For relief, place pillow under abdomen and lie on it to apply pressure. Walking may help. Discharge:   1. Discharge will be dark for first few days (similar to menstrual flow). It will turn to pinkish brown after 2-3 days and creamy yellow for an additional week or two. Moderate flow-use of 4-8 pads daily. 2. Small clots are normal.    Episiotomy Care:  1. May use sitz bath 3-4 times daily. 2. Use analgesic foams or sprays or medicine as ordered. 3. Keep perineal area clean. 4. Continue to use moe bottle after urinating and gently pat from front to back. 5. Stitches will dissolve on their own. If you have stitches, shower only for 2-4 weeks or as directed by your doctor to allow suture line to heal properly. No baths, hot tubs or swimming pools until told it is alright to do so by your doctor. Abdominal Incision Care:  1. Leave open to air after original dressing is removed. 2. Clean incision with soapy water unless otherwise directed. Return of Periods:  1. You should resume menstruating anywhere from 6-8 weeks after birth; up to 24 weeks if breast-feeding. 2. Breast feeding mothers may also resume menstruating during this time. Breast:  1.  ENGORGEMENT, NON-NURSING MOMS:  Wear supportive, well-fitting bra for two weeks, day and night. May apply ice packs for 20 minutes 4 times daily. Avoid breast stimulation. Avoid heat, especially when showering. This will encourage milk letdown. 2. ENGORGEMENT, NURSING MOMS:  Engorgement usually lasts 1-2 days until breasts adjust to babies needs. 3. NIPPLE CARE, NURSING MOMS:  Cleanse nipples with water only. Soap has a drying effect. 4. DO SELF-BREAST EXAMS MONTHLY. Urine Patterns:  Report inability to urinate, frequency, burning, or urgency to the doctor. Bowel Patterns:  1. You should have a normal stool by 2nd or 3rd day after birth. 2. Drinking fluids, walking, and eating roughage help promote regular stools. Hemorrhoid Care:  1. Take 20-minute sitz bath 3-4 times daily. 2. Use TUCKS. 3. Maintain adequate fluid intake. 4. Avoid prolonged sitting. Hygiene:  1. Apply moe pads from front to back. 2. Always clean from front to back. 3. Shower daily. Nutrition/Fluids:  1. A weight loss of 10-12 pounds after birth is normal.  2. It may take from 4 weeks to several months to reach your pre-pregnant state. 3. After weight loss stabilizes, weight loss should not exceed a pound per week. Rest/Sleep:  1. Rest as much as possible. 2. Your first week at home should consist of taking care of yourself and the baby only. Activity:  1. Limit the following activities for 6 weeks. Heavy lifting ,nothing heavier than the baby for first few weeks  No douching, tampons or intercourse for 6 weeks. Wearing constrictive garments around abdomen or legs. 2. May drive in 1-2 weeks or as directed by doctor. 3. Start past-partum exercises. 4. Increased discharge or pain may mean you need to decrease activities. Emotions: You may become depressed, let down or have mood swings and feel weepy for a couple of weeks. This is due to hormonal changes, fatigue, discomfort and over-stimulation, which is normal. Many people refer to this as the baby blues.  This should resolve in a few days. If symptoms worsen contact your doctor. Sexuality:  1. You many have vaginal dryness for first few weeks. May use [place] Jelly. 2. You may have difficulty feeling excitement because of fatigue due to recovery and demands of  care. 3. There may be a leakage of breast milk during sexual activity. (You may need to wear a bra.)  4. Vaginal tenderness-use the female superior or [id number] position  5. Resume intercourse-abstain from intercourse until episiotomy is healed and discharge has stopped (or as directed by your doctor). Contraception:  1. You can become pregnant while breast-feeding. 2. Use contraception as desired and as discussed with your doctor. Depression After Childbirth: Care Instructions  Overview     Many women get the \"baby blues\" during the first few days after childbirth. You may lose sleep, feel irritable, and cry easily. You may feel happy one minute and sad the next. Hormone changes are one cause of these emotional changes. Also, the demands of a new baby, along with visits from relatives or other family needs, can add to the stress. The \"baby blues\" often peak around thefourth day. Then they ease up in less than 2 weeks. If your moodiness or anxiety lasts for more than 2 weeks, or if you feel like life is not worth living, you may have postpartum depression. This is different for each person. Some mothers with serious depression may worry intensely about their infant's well-being. Others may feel distant from their child. Some mothers may even feel that they might harm their baby. Some may have signs ofparanoia, wondering if someone is watching them. Depression is not a sign of weakness. It's a medical condition that requires treatment. Medicine and counseling often work well to reduce depression. Talkto your doctor about taking antidepressant medicine while breastfeeding. Follow-up care is a key part of your treatment and safety.  Be sure to make and go to all appointments, and call your doctor if you are having problems. It's also a good idea to know your test results and keep alist of the medicines you take. How do you know if you are depressed? With all the changes in your life, you may not know if you are depressed. Pregnancy sometimes causes changes in how you feel that are similar to thesymptoms of depression. Symptoms of depression include:  Feeling sad or hopeless and losing interest in daily activities. These are the most common symptoms of depression. Sleeping too much or not enough. Feeling tired. You may feel as if you have no energy. Eating too much or too little. Writing or talking about death, such as writing suicide notes or talking about guns, knives, or pills. If you or someone you know talks about suicide, self-harm, or feeling hopeless, get help right away. Call the [address] [place] [address] at 1-800-273-talk (3-489.647.2063) or text HOME to 637505 to access the [employer]. Consider saving these numbers in your phone. How can you care for yourself at home? Be safe with medicines. Take your medicines exactly as prescribed. Call your doctor if you think you are having a problem with your medicine. Eat a healthy diet so that you can keep up your energy. Get regular daily exercise, such as walks, to help improve your mood. Get as much sunlight as possible. Keep your shades and curtains open. Get outside as much as you can. Avoid using alcohol or other substances to feel better. Get as much rest and sleep as possible. Avoid doing too much. Being too tired can increase depression. Play stimulating music throughout your day and soothing music at night. Schedule outings and visits with friends and family. Ask them to call you regularly, so that you don't feel alone. Ask for help with preparing food and other daily tasks. Family and friends are often happy to help with a .   Be honest with yourself and those who care about you. Tell them about your struggle. Join a support group of new parents. No one can better understand the challenges of caring for a  than other new parents.

## 2022-11-19 NOTE — FLOWSHEET NOTE
Assumed care of pt. Pt sitting up in bed with visitor in room. Tolerating regular diet, voiding and ambulating without difficulty. Assessment completed, stable. Enrique needs.

## 2022-11-19 NOTE — PROGRESS NOTES
Reports feeling better today, started passing gas this morning, bladder doing well,  tolerating po fluids  Vitals noted  Labs noted  A: stable   Plan advance care as tolerated, instructions given for discharge

## 2022-11-19 NOTE — DISCHARGE SUMMARY
Obstetric Discharge Summary    Patient Name: Hemal Hutiron  Patient : 1990  Room/Bed: [facility]0215[facility]  Primary Care Physician: Md Ricardo Parsons Date: 2022  5:36 AM  MRN #: [id number]  [facility] #: [id number]    Admitting Diagnosis  Hemal Huitron is a 28 y.o. female  at 39w0d  Admitting DX: previous c/s, 39 week gestation   OB History          3    Para   3    Term   3            AB        Living   3         SAB        IAB        Ectopic        Molar        Multiple   0    Live Births   1              Patient Active Problem List   Diagnosis   (none) - all problems resolved or deleted         Reasons for Admission on 2022  5:36 AM  Delivery by  section of full-term infant [O82]  39 weeks gestation of pregnancy [Z3A.39]  No comment available  Onset of Labor   Section (Repeat)    Prenatal Procedures  None    Intrapartum Procedures:   Delivery: : Low Cervical, Transverse and See Labor and Delivery Summary        Multiple birth?: No             Postpartum Procedures  None  Signs and symptoms of depression reviewed and evaluated: absent    Postpartum/Operative Complications:  none       Data  Information for the patient's :  Amarjit Anthony Girl Ronal Morales [[id number]]   female   Birth Weight: 7 lb 5.5 oz (3.33 kg)   Discharge With Mother  Complications: No    Discharge Diagnosis:  Hemal Huitron is a 28 y.o. female    39w0d  Delivered a Live Born female by  delivery. Discharge Information/Patient Instructions:     Medication List        START taking these medications      ibuprofen 800 MG tablet  Commonly known as: ADVIL;MOTRIN  Take 1 tablet by mouth 2 times daily as needed for Pain     oxyCODONE-acetaminophen 5-325 MG per tablet  Commonly known as: Percocet  Take 1 tablet by mouth every 6 hours as needed for Pain for up to 5 days. Intended supply: 5 days.  Take lowest dose possible to manage pain            CONTINUE taking these medications      PRENATAL VITAMINS PO               Where to Get Your Medications        These medications were sent to [id number], [address] [facility]  [address], [place] [address]      Phone: [phone]   ibuprofen 800 MG tablet  oxyCODONE-acetaminophen 5-325 MG per tablet         No discharge procedures on file. Activity: activity as tolerated  Diet: regular diet  Wound Care: keep wound clean and dry    Labs:   Admission on 11/17/2022   Component Date Value Ref Range Status    SARS-CoV-2, NAAT 11/17/2022 Not Detected  Not Detected Final    Comment: Rapid NAAT:   Negative results should be treated as presumptive and,  if inconsistent with clinical signs and symptoms or necessary for  patient management, should be tested with an alternative molecular  assay. Negative results do not preclude SARS-CoV-2 infection and  should not be used as the sole basis for patient management decisions. This test has been authorized by the FDA under an Emergency Use  Authorization (EUA) for use by authorized laboratories.     Fact sheet for Healthcare Providers:  http://www.tran.tony/  Fact sheet for Patients: http://www.tran.tony/    METHODOLOGY: Isothermal Nucleic Acid Amplification      Amphetamine Screen, Urine 11/17/2022 Negative  Negative <500 ng/mL Final    Barbiturate Screen, Ur 11/17/2022 Negative  Negative < 200 ng/mL Final    Benzodiazepine Screen, Urine 11/17/2022 Negative  Negative <150 ng/mL Final    Cannabinoid Scrn, Ur 11/17/2022 Negative  Negative <50 ng/mL Final    Cocaine Metabolite Screen, Urine 11/17/2022 Negative  Negative <150 ng/mL Final    Opiate Scrn, Ur 11/17/2022 Negative  Negative < 100 ng/mL Final    PCP Screen, Urine 11/17/2022 Negative  Negative <25 ng/mL Final    Methadone Screen, Urine 11/17/2022 Negative  Negative <200 ng/mL Final    Propoxyphene Scrn, Ur 11/17/2022 Negative Negative <300 ng/mL Final    Tricyclic 84/30/9241 Negative  Negative <300 ng/mL Final    Oxycodone Urine 11/17/2022 Negative  Negative <100 ng/mL Final    Buprenorphine Urine 11/17/2022 Negative  Negative <10 ng/mL Final    Methamphetamine, Urine 11/17/2022 Negative  Negative <500 ng/mL Final    Drug Screen Comment: 11/17/2022 see below   Final    Comment: This method is an uncomfirmed screening test to detect only these drug  classes in urine as part of a medical workup. Confirmatory testing  by another method should be ordered if clinically indicated.       ABO/Rh 11/17/2022 A NEG   Final    Antibody Screen 11/17/2022 POS   Final    WBC 11/17/2022 8.9  4.8 - 10.8 K/uL Final    RBC 11/17/2022 4.44  4.20 - 5.40 M/uL Final    Hemoglobin 11/17/2022 12.7  12.0 - 16.0 g/dL Final    Hematocrit 11/17/2022 37.9  37.0 - 47.0 % Final    MCV 11/17/2022 85.4  81.0 - 99.0 fL Final    MCH 11/17/2022 28.6  27.0 - 31.0 pg Final    MCHC 11/17/2022 33.5  33.0 - 37.0 g/dL Final    RDW 11/17/2022 13.1  11.5 - 14.5 % Final    Platelets 81/76/0848 223  130 - 400 K/uL Final    MPV 11/17/2022 10.4  9.4 - 12.3 fL Final    RPR 11/17/2022 Non-reactive  Non-reactive Final    Antibody ID 11/17/2022 POS, PassiveAnti-D,Patient Rec'd RHIG   Final    Comment: @11/17/22 08:12 by Amanda Heredia:  PATIENT HAD RHOGAM 10/25/22      Product Code Blood Bank 11/17/2022 [id number]   Preliminary    Description Blood Bank 11/17/2022 Red Blood Cells, Leuko-reduced   Preliminary    Unit Number 11/17/2022 [id number]   Preliminary    Dispense Status Blood Bank 11/17/2022 selected   Preliminary    Product Code Blood Bank 11/17/2022 [id number]   Preliminary    Description Blood Bank 11/17/2022 Red Blood Cells, Leuko-reduced   Preliminary    Unit Number 11/17/2022 [id number]   Preliminary    Dispense Status Blood Bank 11/17/2022 selected   Preliminary    ABO/Rh 11/17/2022 A NEG   Final    Fetal Screen 11/17/2022 NEG   Final    RHIG LOT NUMBER 11/17/2022 RhImmuneGlobulin [id number]          issued       1 vial  11/19/22 07:00   Final    WBC 11/18/2022 10.3  4.8 - 10.8 K/uL Final    RBC 11/18/2022 3.37 (A)  4.20 - 5.40 M/uL Final    Hemoglobin 11/18/2022 9.7 (A)  12.0 - 16.0 g/dL Final    Hematocrit 11/18/2022 29.7 (A)  37.0 - 47.0 % Final    MCV 11/18/2022 88.1  81.0 - 99.0 fL Final    MCH 11/18/2022 28.8  27.0 - 31.0 pg Final    MCHC 11/18/2022 32.7 (A)  33.0 - 37.0 g/dL Final    RDW 11/18/2022 13.3  11.5 - 14.5 % Final    Platelets 63/13/8611 173  130 - 400 K/uL Final    MPV 11/18/2022 10.9  9.4 - 12.3 fL Final    Neutrophils % 11/18/2022 61.8  50.0 - 65.0 % Final    Lymphocytes % 11/18/2022 28.3  20.0 - 40.0 % Final    Monocytes % 11/18/2022 7.5  0.0 - 10.0 % Final    Eosinophils % 11/18/2022 1.7  0.0 - 5.0 % Final    Basophils % 11/18/2022 0.3  0.0 - 1.0 % Final    Neutrophils Absolute 11/18/2022 6.4  1.5 - 7.5 K/uL Final    Immature Granulocytes # 11/18/2022 0.0  K/uL Final    Lymphocytes Absolute 11/18/2022 2.9  1.1 - 4.5 K/uL Final    Monocytes Absolute 11/18/2022 0.80  0.00 - 0.90 K/uL Final    Eosinophils Absolute 11/18/2022 0.20  0.00 - 0.60 K/uL Final    Basophils Absolute 11/18/2022 0.00  0.00 - 0.20 K/uL Final   ]      Discharge to: Home  Follow up in 2 weeks       Discharge Date: 11/19/2022      Reynaldo Shaffer MD      Comments: maternal condition is stable and rapidly improving  Home care, Follow-up care and birth control were reviewed. Signs and symptoms of mastitis and Post Partum Depression were reviewed. The patient is to notify her physician if any of these occur. The patient was counseled on secondary smoke risks and the increased risk of sudden infant death syndrome and respiratory problems to her baby with exposure. She was counseled on various alternate recommendations to decrease the exposure to secondary smoke to her children.

## 2022-11-19 NOTE — PROGRESS NOTES
Patient received discharge teaching at [id number] by NEELIMA Ha and Ruth Wallace RN. Patient is awaiting visit from pediatrician to determine if baby will be discharged as well.

## 2022-11-21 LAB
BLOOD BANK DISPENSE STATUS: NORMAL
BLOOD BANK DISPENSE STATUS: NORMAL
BLOOD BANK PRODUCT CODE: NORMAL
BLOOD BANK PRODUCT CODE: NORMAL
BPU ID: NORMAL
BPU ID: NORMAL
DESCRIPTION BLOOD BANK: NORMAL
DESCRIPTION BLOOD BANK: NORMAL

## 2024-08-07 ENCOUNTER — OFFICE VISIT (OUTPATIENT)
Dept: OBSTETRICS AND GYNECOLOGY | Age: 34
End: 2024-08-07
Payer: COMMERCIAL

## 2024-08-07 VITALS
DIASTOLIC BLOOD PRESSURE: 64 MMHG | HEIGHT: 65 IN | SYSTOLIC BLOOD PRESSURE: 108 MMHG | WEIGHT: 233 LBS | BODY MASS INDEX: 38.82 KG/M2

## 2024-08-07 DIAGNOSIS — Z30.09 FAMILY PLANNING COUNSELING: ICD-10-CM

## 2024-08-07 DIAGNOSIS — Z76.89 ENCOUNTER TO ESTABLISH CARE: Primary | ICD-10-CM

## 2024-08-07 PROCEDURE — 1160F RVW MEDS BY RX/DR IN RCRD: CPT

## 2024-08-07 PROCEDURE — 1159F MED LIST DOCD IN RCRD: CPT

## 2024-08-07 PROCEDURE — 99213 OFFICE O/P EST LOW 20 MIN: CPT

## 2024-08-07 NOTE — PROGRESS NOTES
Subjective   Emmie Noonan is a 34 y.o. female.     History of Present Illness  The patient is new to [facility] and presents today to establish care and discuss family planning. The patient is overdue for her well woman examination and will schedule this at the completion of this appointment. She and her fiance have been actively trying to conceive for the past 6 months with tracking cycles, taking ovulation testing and timing intercourse with positive results without success. They are considering IUI.  Other  This is a new problem. The current episode started more than 1 month ago. The problem occurs daily. The problem has been waxing and waning. Pertinent negatives include no abdominal pain, anorexia, arthralgias, change in bowel habit, chest pain, chills, congestion, coughing, diaphoresis, fatigue, fever, headaches, joint swelling, myalgias, nausea, neck pain, numbness, rash, sore throat, swollen glands, urinary symptoms, vertigo, visual change, vomiting or weakness. Nothing aggravates the symptoms. She has tried nothing for the symptoms. The treatment provided no relief.            Review of Systems   Constitutional: Negative.  Negative for chills, diaphoresis, fatigue and fever.   HENT: Negative.  Negative for congestion, sore throat and swollen glands.    Eyes: Negative.    Respiratory: Negative.  Negative for cough.    Cardiovascular: Negative.  Negative for chest pain.   Gastrointestinal: Negative.  Negative for abdominal pain, anorexia, change in bowel habit, nausea and vomiting.   Endocrine: Negative.    Genitourinary: Negative.    Musculoskeletal: Negative.  Negative for arthralgias, joint swelling, myalgias and neck pain.   Skin: Negative.  Negative for rash.   Allergic/Immunologic: Negative.    Neurological: Negative.  Negative for vertigo, weakness and numbness.   Hematological: Negative.    Psychiatric/Behavioral: Negative.         Objective   Physical Exam  Vitals and nursing note reviewed.    Constitutional:       General: She is not in acute distress.     Appearance: Normal appearance. She is not ill-appearing or diaphoretic.   HENT:      Head: Normocephalic and atraumatic.   Cardiovascular:      Rate and Rhythm: Normal rate and regular rhythm.      Pulses: Normal pulses.      Heart sounds: Normal heart sounds.   Pulmonary:      Effort: Pulmonary effort is normal. No respiratory distress.      Breath sounds: Normal breath sounds.   Abdominal:      General: Bowel sounds are normal.   Musculoskeletal:         General: No deformity.      Cervical back: Normal range of motion.   Skin:     Coloration: Skin is not pale.   Neurological:      General: No focal deficit present.      Mental Status: She is alert.   Psychiatric:         Mood and Affect: Mood normal.         Behavior: Behavior normal.         Thought Content: Thought content normal.         Judgment: Judgment normal.       Assessment & Plan   Diagnoses and all orders for this visit:    1. Encounter to establish care (Primary)  Comments:  The patient is new to the office and myself and would like to establish care. The patient is overdue for her well woman exam and will schedule this today for completion in the near future.    2. Family planning counseling  Comments:  The patient and her fiance are present and would like to discuss family planning. They are interesting in IUI. The patient reports she has had 3 previous pregnancies, delivered at term via . She has a history of placenta previa with her first pregnancy and gestational hypertension with her last pregnancy. Her fiance has not fathered a child per their report. They are interested in pursuing IUI. Smoking cessation encouraged as she is a current every day cigarette smoker. She is taking Suboxone daily as prescribed due to history of methamphetamine abuse. She has a history of THC use. Patient encouraged to refrain from use of this while trying to conceive and with future  pregnancy. She is not currently taking a PNV and was encouraged to start. Patient will schedule an appointment with MD for consultation for potential IUI.        Class 2 Severe Obesity (BMI >=35 and <=39.9). Obesity-related health conditions include the following: hypertension. Obesity is unchanged. BMI is is above average; BMI management plan is completed. We discussed portion control, increasing exercise, and Information on healthy weight added to patient's after visit summary.       Marielos Dockery, APRN

## 2024-08-08 PROCEDURE — 87205 SMEAR GRAM STAIN: CPT

## 2024-08-08 PROCEDURE — 87077 CULTURE AEROBIC IDENTIFY: CPT

## 2024-08-08 PROCEDURE — 87186 SC STD MICRODIL/AGAR DIL: CPT

## 2024-08-08 PROCEDURE — 87070 CULTURE OTHR SPECIMN AEROBIC: CPT

## 2024-08-14 ENCOUNTER — OFFICE VISIT (OUTPATIENT)
Age: 34
End: 2024-08-14
Payer: COMMERCIAL

## 2024-08-14 VITALS
DIASTOLIC BLOOD PRESSURE: 64 MMHG | WEIGHT: 235 LBS | HEIGHT: 65 IN | BODY MASS INDEX: 39.15 KG/M2 | SYSTOLIC BLOOD PRESSURE: 106 MMHG

## 2024-08-14 DIAGNOSIS — F17.200 SMOKER: ICD-10-CM

## 2024-08-14 DIAGNOSIS — Z31.9 INFERTILITY MANAGEMENT: Primary | ICD-10-CM

## 2024-08-14 PROCEDURE — 99213 OFFICE O/P EST LOW 20 MIN: CPT | Performed by: OBSTETRICS & GYNECOLOGY

## 2024-08-14 NOTE — PROGRESS NOTES
"Chief Complaint  Infertility (Pt is here for an IUI consultation.  Patient and her fiance have been actively trying to conceive for the past 6 months with tracking cycles, taking ovulation testing and timing intercourse with positive results without pregnancy success. They are wanting to discuss an IUI.   Pt has had three successful pregnancies in the past and had no issues; children are not with current Fiance.  Per patient, fiance is 51 with no biological children and has done three OTC sperm testing with good results.  )    Subjective        Emmie Noonan presents to [facility]  History of Present Illness    Patient presents today to discuss conception  She has conceived 3 previous times without difficulty  She has a new partner     Objective   Vital Signs:  /64   Ht 165.1 cm (65\")   Wt 107 kg (235 lb)   BMI 39.11 kg/m²   Estimated body mass index is 39.11 kg/m² as calculated from the following:    Height as of this encounter: 165.1 cm (65\").    Weight as of this encounter: 107 kg (235 lb).            Physical Exam  Constitutional:       General: She is not in acute distress.     Appearance: Normal appearance. She is not toxic-appearing.   HENT:      Head: Normocephalic and atraumatic.      Nose: Nose normal.   Neurological:      General: No focal deficit present.      Mental Status: She is alert.   Psychiatric:         Mood and Affect: Mood normal.         Behavior: Behavior normal.         Thought Content: Thought content normal.         Judgment: Judgment normal.        Result Review :                Assessment and Plan   Diagnoses and all orders for this visit:    1. Infertility management (Primary)    2. Smoker             Follow Up   No follow-ups on file.  Patient was given instructions and counseling regarding her condition or for health maintenance advice. Please see specific information pulled into the AVS if appropriate.     she does not appear to be a good historian " in terms of when her last menstrual period was  I encouraged her to start tracking that  Follow-up on semen analysis results    Armen Smith MD

## 2024-08-16 ENCOUNTER — TELEPHONE (OUTPATIENT)
Age: 34
End: 2024-08-16
Payer: COMMERCIAL

## 2024-08-16 NOTE — TELEPHONE ENCOUNTER
Found result note on partner's semen analysis in Jyoti's in basket.  Called pt for an update but no answer or VM.  Cycle day 10 US scheduled.  If pt returns call can be updated with US appt 8/26/24.

## 2024-08-16 NOTE — TELEPHONE ENCOUNTER
Pt seen to discuss fertility on 08/14/24.  Partner's semen analysis done and updated.  Pt called today with update of starting her cycle today.  Pt advised we will document and update her on further advise on Monday.  Pt voiced understanding.

## 2024-08-26 ENCOUNTER — TELEPHONE (OUTPATIENT)
Dept: OBSTETRICS AND GYNECOLOGY | Age: 34
End: 2024-08-26
Payer: COMMERCIAL

## 2024-08-26 NOTE — TELEPHONE ENCOUNTER
Called and informed pt Dr. Smith reviewed u/s and wants to repeat Wednesday, appt scheduled for 0930, pt voiced understanding.

## 2024-08-28 ENCOUNTER — TELEPHONE (OUTPATIENT)
Age: 34
End: 2024-08-28
Payer: COMMERCIAL

## 2024-08-28 ENCOUNTER — TELEPHONE (OUTPATIENT)
Age: 34
End: 2024-08-28

## 2024-08-28 DIAGNOSIS — Z31.9 INFERTILITY MANAGEMENT: Primary | ICD-10-CM

## 2024-08-28 RX ORDER — CHORIOGONADOTROPIN ALFA 250 UG/.5ML
250 INJECTION, SOLUTION SUBCUTANEOUS ONCE
Qty: 0.5 ML | Refills: 0 | Status: SHIPPED | OUTPATIENT
Start: 2024-08-28 | End: 2024-08-29

## 2024-08-28 NOTE — TELEPHONE ENCOUNTER
Called and informed pt Dr. Smith reviewed pts cycle day u/s and wants her to do Ovidrel  injection this evening and IUI tomorrow.  I informed her that her injection was called in to [facility] and can pick it up at her convenience.  I also informed her to arrive at 1215 to collect in office and I would put her down for 1345 for appt, pt voiced understanding.

## 2024-08-29 ENCOUNTER — PROCEDURE VISIT (OUTPATIENT)
Age: 34
End: 2024-08-29
Payer: COMMERCIAL

## 2024-08-29 VITALS
SYSTOLIC BLOOD PRESSURE: 118 MMHG | HEIGHT: 65 IN | WEIGHT: 233 LBS | BODY MASS INDEX: 38.82 KG/M2 | DIASTOLIC BLOOD PRESSURE: 78 MMHG

## 2024-08-29 DIAGNOSIS — Z31.89 ENCOUNTER FOR ARTIFICIAL INSEMINATION: Primary | ICD-10-CM

## 2024-08-29 PROBLEM — Z34.90 PREGNANCY: Status: RESOLVED | Noted: 2022-09-08 | Resolved: 2024-08-29

## 2024-08-29 RX ORDER — PAROXETINE 20 MG/1
1 TABLET, FILM COATED ORAL DAILY
COMMUNITY
Start: 2024-08-25

## 2024-08-29 NOTE — PROGRESS NOTES
"Emmie Noonan is a 34 y.o. female here today for intrauterine insemination.  She had a mature follicle on US and triggered yesterday.  She is not on ovulation induction.  She has had her infertility workup through Dr. Smith.  This is insemination #1.    Visit Vitals  /78 (BP Location: Left arm, Patient Position: Sitting)   Ht 165.1 cm (65\")   Wt 106 kg (233 lb)   LMP 08/18/2024 (Exact Date)   BMI 38.77 kg/m²        The patient provided a fresh labeled semen sample for use for intrauterine insemination.  The semen sample was processed and washed using our standard laboratory procedure.  The specimen was allowed to liquefy for approximately 30 minutes, and was then labeled and transferred to a sterile centrifuge tube.  The volume was measured at 3.2 mL.  An equal volume of multipurpose handling medium was added to the centrifuge tube, and the sample mixed.  The sample was spun for 10 minutes, good pellet formation was noted, and the supernatant then discarded.  Another 2 mL of multipurpose handling medium was added to the centrifuge tube, and the specimen mixed.  The sample was spun for another 10 minutes, good pellet formation was noted, and the supernatant was then discarded.  0.3 mL multipurpose handling medium was then added to resuspend the pellett to be used for insemination.    The patient was placed in the lithotomy position on the exam table.  The cervix was visualized with a speculum.  The cervix was probed and found to be patent.  I have reviewed the slide prepared from the semen washings.  There are numerous mobile sperm noted in a fairly high concentration.  The prepared semen sample, of approximately 0.6cc, was drawn up in an insemination catheter and the catheter placed transcervically to the uterine fundus.  The entire sample was placed at the uterine fundus without difficulty.  The catheter and speculum were removed.  The patient was allowed to stay in a hips elevated position for 20 minutes " prior to leaving the office.  She will notify the office in a couple of weeks if she has a period or a positive pregnancy test.

## 2024-09-03 ENCOUNTER — OFFICE VISIT (OUTPATIENT)
Dept: OBSTETRICS AND GYNECOLOGY | Age: 34
End: 2024-09-03
Payer: COMMERCIAL

## 2024-09-03 VITALS — BODY MASS INDEX: 38.77 KG/M2 | HEIGHT: 65 IN

## 2024-09-03 DIAGNOSIS — Z53.21 PROCEDURE AND TREATMENT NOT CARRIED OUT DUE TO PATIENT LEAVING PRIOR TO BEING SEEN BY HEALTH CARE PROVIDER: Primary | ICD-10-CM

## 2024-09-16 ENCOUNTER — TELEPHONE (OUTPATIENT)
Age: 34
End: 2024-09-16

## 2024-09-22 ENCOUNTER — TELEPHONE (OUTPATIENT)
Dept: URGENT CARE | Facility: CLINIC | Age: 34
End: 2024-09-22
Payer: COMMERCIAL

## 2024-09-23 ENCOUNTER — TELEPHONE (OUTPATIENT)
Age: 34
End: 2024-09-23
Payer: COMMERCIAL

## 2024-09-24 ENCOUNTER — TELEPHONE (OUTPATIENT)
Dept: OBSTETRICS AND GYNECOLOGY | Age: 34
End: 2024-09-24

## 2024-09-24 NOTE — TELEPHONE ENCOUNTER
Called and informed pt that [facility] is the only pharmacy that carries this rx and has to be called in there, pt voiced understanding.

## 2024-09-24 NOTE — TELEPHONE ENCOUNTER
Caller: MISSY JOLLY    Relationship to patient: SELF    Best call back number: [phone] (home)       Patient is needing: PT HAS AN APPT TOMORROW FOR AN ULTRASOUND. SHE STATES A TRIGGER SHOT SHOULD BE SENT TO HER PHARMACY TODAY. SHE WANTS TO MAKE SURE THERX WILL GO TO THE CORRECT PHARMACY AS SHE STATES LAST MONTH IT WAS SENT TO THE WRONG ONE AND IT CAUSED A LOT OF ISSUES. SHE WOULD LIKE THIS RX TO BE SENT TO [facility]

## 2024-09-25 ENCOUNTER — TELEPHONE (OUTPATIENT)
Age: 34
End: 2024-09-25
Payer: COMMERCIAL

## 2024-09-26 DIAGNOSIS — Z31.9 INFERTILITY MANAGEMENT: Primary | ICD-10-CM

## 2024-09-26 RX ORDER — CHORIOGONADOTROPIN ALFA 250 UG/.5ML
250 INJECTION, SOLUTION SUBCUTANEOUS ONCE
Qty: 0.5 ML | Refills: 0 | Status: SHIPPED | OUTPATIENT
Start: 2024-09-26 | End: 2024-09-26

## 2024-09-27 ENCOUNTER — PROCEDURE VISIT (OUTPATIENT)
Dept: OBSTETRICS AND GYNECOLOGY | Age: 34
End: 2024-09-27
Payer: COMMERCIAL

## 2024-09-27 VITALS
WEIGHT: 238 LBS | DIASTOLIC BLOOD PRESSURE: 62 MMHG | SYSTOLIC BLOOD PRESSURE: 120 MMHG | BODY MASS INDEX: 39.65 KG/M2 | RESPIRATION RATE: 18 BRPM | HEIGHT: 65 IN

## 2024-09-27 DIAGNOSIS — Z31.89 ENCOUNTER FOR ARTIFICIAL INSEMINATION: ICD-10-CM

## 2024-09-27 DIAGNOSIS — Z31.9 INFERTILITY MANAGEMENT: Primary | ICD-10-CM

## 2024-10-18 ENCOUNTER — TELEPHONE (OUTPATIENT)
Age: 34
End: 2024-10-18
Payer: COMMERCIAL

## 2024-10-18 DIAGNOSIS — Z31.9 INFERTILITY MANAGEMENT: Primary | ICD-10-CM

## 2024-10-18 RX ORDER — CHORIOGONADOTROPIN ALFA 250 UG/.5ML
250 INJECTION, SOLUTION SUBCUTANEOUS ONCE
Qty: 0.5 ML | Refills: 0 | Status: SHIPPED | OUTPATIENT
Start: 2024-10-18 | End: 2024-10-18

## 2024-10-18 NOTE — TELEPHONE ENCOUNTER
Called to inform pt Dr. Smith reviewed her u/s and wants to repeat u/s on Monday, pt voiced understanding and was scheduled for Monday at 0930.

## 2024-10-21 ENCOUNTER — TELEPHONE (OUTPATIENT)
Age: 34
End: 2024-10-21
Payer: COMMERCIAL

## 2024-10-21 NOTE — TELEPHONE ENCOUNTER
Called and informed pt Dr. Smith reviewed her u/s and wants to repeat u/s on Wednesday, pt voiced understanding, appt scheduled.

## 2024-10-23 ENCOUNTER — TELEPHONE (OUTPATIENT)
Age: 34
End: 2024-10-23
Payer: COMMERCIAL

## 2024-10-23 NOTE — TELEPHONE ENCOUNTER
Called and informed pt Dr. Smith wants to repeat u/s on Friday and f/u with her on Monday to review next steps from here.  Pt voiced understanding and was scheduled for 1300 as she could not come sooner due to court and for 0800 on 10/23 to f/u with Dr. Smith per pt request, pt voiced understanding.

## 2024-10-25 ENCOUNTER — TELEPHONE (OUTPATIENT)
Age: 34
End: 2024-10-25
Payer: COMMERCIAL

## 2024-10-25 NOTE — TELEPHONE ENCOUNTER
Called and informed pt Dr. Smith wants her to trigger and do IUI on Monday.  I informed her to collect at home around 1:15PM on Monday and bring specimen with her to the office at 2:00, pt voiced understanding.

## 2024-10-28 ENCOUNTER — PROCEDURE VISIT (OUTPATIENT)
Age: 34
End: 2024-10-28
Payer: COMMERCIAL

## 2024-10-28 VITALS
BODY MASS INDEX: 39.24 KG/M2 | WEIGHT: 235.5 LBS | SYSTOLIC BLOOD PRESSURE: 108 MMHG | HEIGHT: 65 IN | DIASTOLIC BLOOD PRESSURE: 76 MMHG

## 2024-10-28 DIAGNOSIS — Z31.9 INFERTILITY MANAGEMENT: ICD-10-CM

## 2024-10-28 DIAGNOSIS — Z31.89 ENCOUNTER FOR ARTIFICIAL INSEMINATION: Primary | ICD-10-CM

## 2024-10-28 DIAGNOSIS — F17.200 SMOKER: ICD-10-CM

## 2024-10-28 PROCEDURE — 58322 ARTIFICIAL INSEMINATION: CPT | Performed by: OBSTETRICS & GYNECOLOGY

## 2024-10-28 PROCEDURE — 58323 SPERM WASHING: CPT | Performed by: OBSTETRICS & GYNECOLOGY

## 2024-10-28 RX ORDER — DEXTROAMPHETAMINE SULFATE, DEXTROAMPHETAMINE SACCHARATE, AMPHETAMINE SULFATE AND AMPHETAMINE ASPARTATE 7.5; 7.5; 7.5; 7.5 MG/1; MG/1; MG/1; MG/1
1 CAPSULE, EXTENDED RELEASE ORAL DAILY
COMMUNITY
Start: 2024-10-20

## 2024-10-28 NOTE — PROGRESS NOTES
"Chief Complaint  Procedure (Pt here for IUI #3)    Subjective        Emmie Noonan presents to [facility]  History of Present Illness    Patient presents today for artificial insemination #3  Previous office notes  are reviewed  She was released last night   semen specimen is inspected, washed and inspected again  Motile sperm are noted    Objective   Vital Signs:  /76 (BP Location: Left arm, Patient Position: Sitting, Cuff Size: Large Adult)   Ht 165.1 cm (65\")   Wt 107 kg (235 lb 8 oz)   BMI 39.19 kg/m²   Estimated body mass index is 39.19 kg/m² as calculated from the following:    Height as of this encounter: 165.1 cm (65\").    Weight as of this encounter: 107 kg (235 lb 8 oz).            Physical Exam  Genitourinary:     Comments: Cervix is grossly normal.  Insemination catheter is inserted and resistance is met at the fundus.  Entire specimen is placed into the uterine cavity.  She tolerated this well with minimal cramping.  Hemostasis is noted.       Result Review :                Assessment and Plan   Diagnoses and all orders for this visit:    1. Encounter for artificial insemination (Primary)    2. Infertility management    3. Smoker             Follow Up   Return in about 3 weeks (around 11/18/2024) for Telehealth, with me.  Patient was given instructions and counseling regarding her condition or for health maintenance advice. Please see specific information pulled into the AVS if appropriate.     Call with results  If not pregnant, consider next steps    Armen Smith MD          "

## 2024-11-18 ENCOUNTER — TELEMEDICINE (OUTPATIENT)
Age: 34
End: 2024-11-18
Payer: COMMERCIAL

## 2024-11-18 DIAGNOSIS — Z31.9 INFERTILITY MANAGEMENT: Primary | ICD-10-CM

## 2024-11-18 DIAGNOSIS — Z11.3 SCREENING FOR STDS (SEXUALLY TRANSMITTED DISEASES): Primary | ICD-10-CM

## 2024-11-18 DIAGNOSIS — F17.200 SMOKER: ICD-10-CM

## 2024-11-18 PROCEDURE — 99213 OFFICE O/P EST LOW 20 MIN: CPT | Performed by: OBSTETRICS & GYNECOLOGY

## 2024-11-18 NOTE — PROGRESS NOTES
"Chief Complaint  No chief complaint on file.    Follow-up on infertility    Subjective           Emmie Noonan presents to [facility]  History of Present Illness    Patient follows up today for infertility  She has conceived 3 times before without assistance  The most recent was less than 3 years ago  She has no history of sexually transmitted diseases  That last delivery was a  section  This is a new partner and his semen analysis done 3 months ago was normal  Today is cycle day #9  She wants to try at least 1 more artificial insemination      Semen Analysis - Semen, Voided (2024 13:15)       Objective   Vital Signs:   There were no vitals taken for this visit.    Estimated body mass index is 39.19 kg/m² as calculated from the following:    Height as of 10/28/24: 165.1 cm (65\").    Weight as of 10/28/24: 107 kg (235 lb 8 oz).     Physical Exam   Constitutional: She appears well-developed and well-nourished.   HENT:   Head: Normocephalic and atraumatic.   Neurological: She is alert.   Psychiatric: She has a normal mood and affect.     Result Review :                   Assessment and Plan      Diagnoses and all orders for this visit:    1. Infertility management (Primary)  -     Antimullerian Hormone (AMH)    2. Smoker        Follow Up     No follow-ups on file.  Patient was given instructions and counseling regarding her condition or for health maintenance advice. Please see specific information pulled into the AVS if appropriate.     Mode of Visit: Video  Location of patient: home  Location of provider: [facility]  You have chosen to receive care through a telehealth visit.  The patient has signed the video visit consent form.  The visit included audio and video interaction. No technical issues occurred during this visit.    Follow-up ultrasound later on this week  Plan artificial insemination based on ultrasound results  Follow-up on AMH level  Call for concerns or " questions    Armen Smith MD

## 2024-11-21 ENCOUNTER — TELEPHONE (OUTPATIENT)
Age: 34
End: 2024-11-21
Payer: COMMERCIAL

## 2024-11-21 NOTE — TELEPHONE ENCOUNTER
Called and informed pt Dr. Smith wants to repeat her u/s on Monday, appt scheduled at 0900 per pt request.

## 2024-11-23 LAB
C TRACH RRNA SPEC QL NAA+PROBE: NEGATIVE
N GONORRHOEA RRNA SPEC QL NAA+PROBE: NEGATIVE
T VAGINALIS RRNA SPEC QL NAA+PROBE: NEGATIVE

## 2024-11-25 ENCOUNTER — TELEPHONE (OUTPATIENT)
Age: 34
End: 2024-11-25
Payer: COMMERCIAL

## 2024-11-25 NOTE — TELEPHONE ENCOUNTER
Called and spoke to pt and informed her taht Dr. Smith wants to do IUI tomorrow and to collect at [phone] and to come in at 0900, pt voiced understanding.  She informed me that she did not have time to get the shot prior to tomorrow and was not going to do it this cycle.

## 2024-11-26 ENCOUNTER — PROCEDURE VISIT (OUTPATIENT)
Age: 34
End: 2024-11-26
Payer: COMMERCIAL

## 2024-11-26 VITALS
SYSTOLIC BLOOD PRESSURE: 104 MMHG | BODY MASS INDEX: 40.73 KG/M2 | HEIGHT: 65 IN | WEIGHT: 244.5 LBS | DIASTOLIC BLOOD PRESSURE: 68 MMHG

## 2024-11-26 DIAGNOSIS — F17.200 SMOKER: ICD-10-CM

## 2024-11-26 DIAGNOSIS — Z31.9 INFERTILITY MANAGEMENT: Primary | ICD-10-CM

## 2024-11-26 NOTE — PROGRESS NOTES
"Chief Complaint  Procedure (Pt here for IUI #4)    Subjective        Emmie Noonan presents to [facility]  History of Present Illness    Patient presents today for artificial insemination  Yesterday, she had a mature follicle with a normal endometrial lining  She was released  Sperm specimen is brought from home  Volume is 1.5 cc  Prior to wash, motile sperm are noted  Specimen is washed appropriately  Normal pellet formation occurred  Post wash, motile sperm are noted    Objective   Vital Signs:  /68 (BP Location: Left arm, Patient Position: Sitting, Cuff Size: Large Adult)   Ht 165.1 cm (65\")   Wt 111 kg (244 lb 8 oz)   BMI 40.69 kg/m²   Estimated body mass index is 40.69 kg/m² as calculated from the following:    Height as of this encounter: 165.1 cm (65\").    Weight as of this encounter: 111 kg (244 lb 8 oz).            Physical Exam  Genitourinary:     Comments: Cervix is grossly normal.  The cervix is cannulated without difficulty.  The entire specimen was placed inside the uterus.  She tolerated this well.  Hemostasis is noted.       Result Review :                Assessment and Plan   Diagnoses and all orders for this visit:    1. Infertility management (Primary)    2. Smoker             Follow Up   No follow-ups on file.  Patient was given instructions and counseling regarding her condition or for health maintenance advice. Please see specific information pulled into the AVS if appropriate.    Patient will call with results    Armen Smith MD              "

## 2024-11-28 LAB — MIS SERPL-MCNC: 0.69 NG/ML

## 2024-12-05 ENCOUNTER — TELEPHONE (OUTPATIENT)
Age: 34
End: 2024-12-05
Payer: COMMERCIAL

## 2024-12-05 DIAGNOSIS — Z31.9 INFERTILITY MANAGEMENT: Primary | ICD-10-CM

## 2024-12-05 RX ORDER — CHORIOGONADOTROPIN ALFA 250 UG/.5ML
1 INJECTION, SOLUTION SUBCUTANEOUS ONCE
Qty: 1 ML | Refills: 0 | Status: SHIPPED | OUTPATIENT
Start: 2024-12-05 | End: 2024-12-05

## 2024-12-16 ENCOUNTER — TELEMEDICINE (OUTPATIENT)
Age: 34
End: 2024-12-16
Payer: COMMERCIAL

## 2024-12-16 DIAGNOSIS — Z31.9 INFERTILITY MANAGEMENT: Primary | ICD-10-CM

## 2024-12-16 DIAGNOSIS — F17.200 SMOKER: ICD-10-CM

## 2024-12-16 RX ORDER — CLOMIPHENE CITRATE 50 MG/1
50 TABLET ORAL DAILY
Qty: 5 TABLET | Refills: 3 | Status: SHIPPED | OUTPATIENT
Start: 2024-12-16

## 2024-12-16 NOTE — PROGRESS NOTES
"  Chief Complaint  No chief complaint on file.    Infertility    Subjective           Emmie Noonan presents to [facility]  History of Present Illness    Patient presents today for follow-up  She has now failed for repeat examination attempts  She has been forming follicles on her own without stimulation  Semen analysis has been normal  After the third failed attempt, we discussed referral to infertility but she cannot afford that  She has for 1 more attempt and we did that  We also did an antimalarial hormone which is abnormal  We discussed the meaning of this  We also discussed the treatment with in vitro with possible donor oocytes  This is still not affordable to her  I did discuss with her the possibility of using clomiphene to see if we can improve her chances although donor oocyte may be a better option  She wishes for trial of clomiphene citrate stimulation    Objective   Vital Signs:   There were no vitals taken for this visit.    Estimated body mass index is 40.69 kg/m² as calculated from the following:    Height as of 11/26/24: 165.1 cm (65\").    Weight as of 11/26/24: 111 kg (244 lb 8 oz).     Physical Exam   Constitutional: She appears well-developed and well-nourished.   HENT:   Head: Normocephalic and atraumatic.   Neurological: She is alert.   Psychiatric: She has a normal mood and affect.     Result Review :                   Assessment and Plan      Diagnoses and all orders for this visit:    1. Infertility management (Primary)  -     clomiPHENE (CLOMID) 50 MG tablet; Take 1 tablet by mouth Daily. Take on cycle days 3 thru 7  Dispense: 5 tablet; Refill: 3    2. Smoker        Follow Up     Return for Patient will call for follicle ultrasound.  Patient was given instructions and counseling regarding her condition or for health maintenance advice. Please see specific information pulled into the AVS if appropriate.     Mode of Visit: Video  Location of patient: home  Location " of provider: [facility]  You have chosen to receive care through a telehealth visit.  The patient has signed the video visit consent form.  The visit included audio and video interaction. No technical issues occurred during this visit.    Today is currently cycle day 7  She will time intercourse at home  If this cycle is unsuccessful, will start clomiphene on cycle days 3 through 7 with the next cycle and call us for follicle ultrasound scheduling    Armen Smith MD

## 2024-12-17 ENCOUNTER — TELEPHONE (OUTPATIENT)
Age: 34
End: 2024-12-17

## 2024-12-17 NOTE — TELEPHONE ENCOUNTER
Called to f/u with pt to see what cycle day she was on as I thought it was too late for Clomid, no answer, no voicemail set up.

## 2024-12-17 NOTE — TELEPHONE ENCOUNTER
Caller: Emmie Noonan    Relationship: Self    Best call back number: [phone]     What is the best time to reach you: CALL ANYTIME.    Who are you requesting to speak with (clinical staff, provider,  specific staff member): FIRST AVAILABLE       PATIENT CALLED TO CHECK STATUS OF PATIENT MESSAGE SENT 12/16/24, ATTACHED TO MESSAGE FROM 12/04/24. PATIENT HAS QUESTIONS ABOUT CLOMID AND TO CONFIRM IF APPT IS STILL NEEDED ON 12/19.

## 2025-01-20 ENCOUNTER — TELEPHONE (OUTPATIENT)
Age: 35
End: 2025-01-20
Payer: COMMERCIAL

## 2025-01-21 ENCOUNTER — PROCEDURE VISIT (OUTPATIENT)
Age: 35
End: 2025-01-21
Payer: COMMERCIAL

## 2025-01-21 VITALS
SYSTOLIC BLOOD PRESSURE: 118 MMHG | BODY MASS INDEX: 41.23 KG/M2 | HEIGHT: 65 IN | WEIGHT: 247.5 LBS | DIASTOLIC BLOOD PRESSURE: 78 MMHG

## 2025-01-21 DIAGNOSIS — F17.200 SMOKER: ICD-10-CM

## 2025-01-21 DIAGNOSIS — Z31.9 INFERTILITY MANAGEMENT: Primary | ICD-10-CM

## 2025-01-21 NOTE — PROGRESS NOTES
"Chief Complaint  Procedure (Pt here for IUI #5/LMP 01/08/2025)    Subjective        Emmie Noonan presents to [facility]  History of Present Illness    Patient presents today for artificial insemination  This is her fifth Attempt  She wishes to continue    Specimen is observed prior to washing  Motile sperm are noted  Small washing is completed without incident  Bone formation is noted with exercycle  Multiple sperm are noted after wash    Objective   Vital Signs:  /78 (BP Location: Left arm, Patient Position: Sitting, Cuff Size: Large Adult)   Ht 165.1 cm (65\")   Wt 112 kg (247 lb 8 oz)   BMI 41.19 kg/m²   Estimated body mass index is 41.19 kg/m² as calculated from the following:    Height as of this encounter: 165.1 cm (65\").    Weight as of this encounter: 112 kg (247 lb 8 oz).            Physical Exam  Genitourinary:     Comments: Cervix is easily cannulated without assistance.  Specimen is deployed with minimal discomfort.  She tolerated this well.  Hemostasis is noted.       Result Review :                Assessment and Plan   Diagnoses and all orders for this visit:    1. Infertility management (Primary)    2. Smoker             Follow Up   No follow-ups on file.  Patient was given instructions and counseling regarding her condition or for health maintenance advice. Please see specific information pulled into the AVS if appropriate.    Continue prenatal vitamins  Call with results    Armen Smith MD              "

## 2025-02-03 ENCOUNTER — TELEPHONE (OUTPATIENT)
Age: 35
End: 2025-02-03
Payer: COMMERCIAL

## 2025-02-03 DIAGNOSIS — Z31.9 INFERTILITY MANAGEMENT: Primary | ICD-10-CM

## 2025-04-01 ENCOUNTER — OFFICE VISIT (OUTPATIENT)
Dept: FAMILY MEDICINE CLINIC | Facility: CLINIC | Age: 35
End: 2025-04-01
Payer: COMMERCIAL

## 2025-04-01 VITALS
RESPIRATION RATE: 16 BRPM | SYSTOLIC BLOOD PRESSURE: 124 MMHG | HEIGHT: 65 IN | DIASTOLIC BLOOD PRESSURE: 82 MMHG | BODY MASS INDEX: 41.65 KG/M2 | TEMPERATURE: 98.4 F | HEART RATE: 92 BPM | WEIGHT: 250 LBS

## 2025-04-01 DIAGNOSIS — R41.840 POOR CONCENTRATION: ICD-10-CM

## 2025-04-01 DIAGNOSIS — F11.21 HISTORY OF NARCOTIC ADDICTION: ICD-10-CM

## 2025-04-01 DIAGNOSIS — G25.81 RESTLESS LEG SYNDROME: Primary | ICD-10-CM

## 2025-04-01 DIAGNOSIS — F17.200 TOBACCO DEPENDENCE: ICD-10-CM

## 2025-04-01 DIAGNOSIS — E66.01 MORBID OBESITY WITH BMI OF 40.0-44.9, ADULT: ICD-10-CM

## 2025-04-01 RX ORDER — ROPINIROLE 0.5 MG/1
TABLET, FILM COATED ORAL
Qty: 30 TABLET | Refills: 1 | Status: SHIPPED | OUTPATIENT
Start: 2025-04-01

## 2025-04-01 NOTE — PROGRESS NOTES
"Chief Complaint  [facility] and Restless Legs Syndrome    Subjective    History of Present Illness      Patient presents to [facility] for       History of Present Illness  The patient is a 35-year-old female who presents to [facility].    She has been diagnosed with restless leg syndrome, which manifests as involuntary leg movements during sleep, causing significant discomfort. She is not currently on any mood-altering medications that could potentially exacerbate her restlessness. Her last laboratory work was conducted approximately 3 years ago. She has previously tried muscle relaxants and was prescribed gabapentin by her physician, who suggested that her symptoms might be due to iron deficiency.    She is seeking to resume her ADHD medication regimen. She was previously under the care of Dr. Wright but discontinued visits after completing her Intensive Outpatient Program (IOP) sessions. She does not perceive a need for psychiatric consultation at this time. She has completed all her classes and graduated from them. She was previously obtaining her ADHD medication from [facility], but they do not prescribe the same medication.     Additionally, she reports experiencing weight management issues.    FAMILY HISTORY  Her mother has restless leg syndrome.    MEDICATIONS  Current: Suboxone  Past: Gabapentin    Review of Systems    I have reviewed and agree with the HPI and ROS information as above.  SABRA Gibson     Objective   Vital Signs:   /82 (BP Location: Left arm, Patient Position: Sitting, Cuff Size: Adult)   Pulse 92   Temp 98.4 °F (36.9 °C)   Resp 16   Ht 165.1 cm (65\")   Wt 113 kg (250 lb)   BMI 41.60 kg/m²            Physical Exam  Constitutional:       Appearance: She is well-developed. She is morbidly obese.   HENT:      Head: Normocephalic and atraumatic.      Right Ear: Tympanic membrane, ear canal and external ear normal.      Left Ear: Tympanic " membrane, ear canal and external ear normal.      Nose: Nose normal. No septal deviation, nasal tenderness or congestion.      Mouth/Throat:      Lips: Pink. No lesions.      Mouth: Mucous membranes are moist. No oral lesions.      Dentition: Normal dentition.      Pharynx: Oropharynx is clear. No pharyngeal swelling, oropharyngeal exudate or posterior oropharyngeal erythema.   Eyes:      General: Lids are normal. Vision grossly intact. No scleral icterus.        Right eye: No discharge.         Left eye: No discharge.      Extraocular Movements: Extraocular movements intact.      Conjunctiva/sclera: Conjunctivae normal.      Right eye: Right conjunctiva is not injected.      Left eye: Left conjunctiva is not injected.      Pupils: Pupils are equal, round, and reactive to light.   Neck:      Thyroid: No thyroid mass.      Trachea: Trachea normal.   Cardiovascular:      Rate and Rhythm: Normal rate and regular rhythm.      Heart sounds: Normal heart sounds. No murmur heard.     No gallop.   Pulmonary:      Effort: Pulmonary effort is normal.      Breath sounds: Normal breath sounds and air entry. No wheezing, rhonchi or rales.   Abdominal:      General: There is no distension.      Palpations: Abdomen is soft. There is no mass.      Tenderness: There is no abdominal tenderness. There is no right CVA tenderness, left CVA tenderness, guarding or rebound.   Musculoskeletal:         General: No tenderness or deformity. Normal range of motion.      Cervical back: Full passive range of motion without pain, normal range of motion and neck supple.      Thoracic back: Normal.      Right lower leg: No edema.      Left lower leg: No edema.   Skin:     General: Skin is warm and dry.      Coloration: Skin is not jaundiced.      Findings: No rash.   Neurological:      Mental Status: She is alert and oriented to person, place, and time.      Sensory: Sensation is intact.      Motor: Motor function is intact.      Coordination:  Coordination is intact.      Gait: Gait is intact.      Deep Tendon Reflexes: Reflexes are normal and symmetric.   Psychiatric:         Mood and Affect: Affect normal. Mood is anxious.         Judgment: Judgment normal.            PHQ-2 Depression Screening    Little interest or pleasure in doing things? Not at all   Feeling down, depressed, or hopeless? Not at all   PHQ-2 Total Score 0      PHQ-9 Depression Screening  Little interest or pleasure in doing things? Not at all   Feeling down, depressed, or hopeless? Not at all   PHQ-2 Total Score 0   Trouble falling or staying asleep, or sleeping too much?     Feeling tired or having little energy?     Poor appetite or overeating?     Feeling bad about yourself - or that you are a failure or have let yourself or your family down?     Trouble concentrating on things, such as reading the newspaper or watching television?     Moving or speaking so slowly that other people could have noticed? Or the opposite - being so fidgety or restless that you have been moving around a lot more than usual?     Thoughts that you would be better off dead, or of hurting yourself in some way?     PHQ-9 Total Score     If you checked off any problems, how difficult have these problems made it for you to do your work, take care of things at home, or get along with other people? Not difficult at all           Result Review  Data Reviewed:                   Assessment and Plan      Diagnoses and all orders for this visit:    1. Restless leg syndrome (Primary)  -     rOPINIRole (REQUIP) 0.5 MG tablet; Take 1/2 tab PO nightly x 2 days then increase to 1 tab PO nightly; take 1 hour before bedtime.  Dispense: 30 tablet; Refill: 1    2. Poor concentration    3. History of narcotic addiction    4. Tobacco dependence    5. Morbid obesity with BMI of 40.0-44.9, adult        Assessment & Plan  1. Restless Leg Syndrome.  She reports experiencing restless leg syndrome and has previously tried muscle  relaxers.  A prescription for Requip  will be initiated, to be taken nightly. The potential need for dosage adjustments over time was discussed. She will follow up in a month to assess the effectiveness of the treatment and make any necessary changes.    2. Possible ADHD  She requested to be put back on her ADHD medication. Discussed current recommendations for being evaluated for adhd which would be referred out of office to psychiatry at this time.         Follow-up  The patient will follow up in 1 month.        Follow Up   Return in about 1 month (around 5/1/2025) for Annual physical.  Patient was given instructions and counseling regarding her condition or for health maintenance advice. Please see specific information pulled into the AVS if appropriate.     Patient or patient representative verbalized consent for the use of Ambient Listening during the visit with  SABRA Gibson for chart documentation. 4/1/2025  14:23 CDT

## 2025-04-30 ENCOUNTER — TELEPHONE (OUTPATIENT)
Dept: FAMILY MEDICINE CLINIC | Facility: CLINIC | Age: 35
End: 2025-04-30
Payer: COMMERCIAL

## 2025-04-30 NOTE — TELEPHONE ENCOUNTER
Caller: Emmie Noonan    Relationship: Self    Best call back number: [phone]     What is the best time to reach you: ANY    Who are you requesting to speak with (clinical staff, provider,  specific staff member): CLINICAL    Do you know the name of the person who called: SELF    What was the call regarding: PT SAYS THAT SHE WAS PUT ON ROPINIROLE FOR RESTLESS LEGS ON 4/1/2025. SHE SAYS THAT SHE HAS TAKEN THIS AS PRESCRIBED SINCE THEN AND IT IS NOT HELPING. SHE SAYS THAT ON THE BOTTLE IT SAYS THAT SMOKING MAY DECREASE THE EFFECTS OF MEDICATION AND SHE IS A SMOKER. SHE IS REQUESTING A CALL BACK TO DISCUSS ALTERNATIVE MEDICATIONS ON WHAT SHE CAN TAKE FOR THIS. SHE SAID THAT SHE USE TO BE PRESCRIBED GABAPENTIN IN THE PAST FOR THIS AND IT SEEMED TO HELP. PLEASE CALL TO ADVISE.     Is it okay if the provider responds through MyChart: YES      
Pt contacted office regarding wanting Gabapentin prescribed which she states she was prescribed previously by another provider.  Pt states she was prescribed Requip for restless leg syndrome but states it's not working.  Pt states she is not not sleeping, is in pain and can't physically  her daughter due to the Requip not helping with her restless leg syndrome. Pt reminded what Emerita had told her regarding this request which she also made on 4-2-25. Pt informed that she is due for a 1 month f/u; last seen on 4-1-25 and okayed for a 1 month f/u. Offered to schedule an appointment but patient refused and hung-up.    
61 y/o female with left hip stiffness and pain . scheduled for left hip replacement

## 2025-05-02 ENCOUNTER — HOSPITAL ENCOUNTER (EMERGENCY)
Facility: HOSPITAL | Age: 35
Discharge: LEFT BEFORE DISCHARGE | End: 2025-05-02
Payer: COMMERCIAL

## 2025-05-02 ENCOUNTER — APPOINTMENT (OUTPATIENT)
Dept: CT IMAGING | Facility: HOSPITAL | Age: 35
End: 2025-05-02
Payer: COMMERCIAL

## 2025-05-02 VITALS
OXYGEN SATURATION: 98 % | RESPIRATION RATE: 20 BRPM | HEIGHT: 65 IN | TEMPERATURE: 98.3 F | HEART RATE: 106 BPM | WEIGHT: 237.5 LBS | SYSTOLIC BLOOD PRESSURE: 157 MMHG | DIASTOLIC BLOOD PRESSURE: 94 MMHG | BODY MASS INDEX: 39.57 KG/M2

## 2025-05-02 DIAGNOSIS — M54.9 ACUTE BILATERAL BACK PAIN, UNSPECIFIED BACK LOCATION: Primary | ICD-10-CM

## 2025-05-02 LAB
ALBUMIN SERPL-MCNC: 4.3 G/DL (ref 3.5–5.2)
ALBUMIN/GLOB SERPL: 1.2 G/DL
ALP SERPL-CCNC: 61 U/L (ref 39–117)
ALT SERPL W P-5'-P-CCNC: 21 U/L (ref 1–33)
ANION GAP SERPL CALCULATED.3IONS-SCNC: 14 MMOL/L (ref 5–15)
AST SERPL-CCNC: 22 U/L (ref 1–32)
BASOPHILS # BLD AUTO: 0.04 10*3/MM3 (ref 0–0.2)
BASOPHILS NFR BLD AUTO: 0.5 % (ref 0–1.5)
BILIRUB SERPL-MCNC: 0.2 MG/DL (ref 0–1.2)
BUN SERPL-MCNC: 9 MG/DL (ref 6–20)
BUN/CREAT SERPL: 15.5 (ref 7–25)
CALCIUM SPEC-SCNC: 9.3 MG/DL (ref 8.6–10.5)
CHLORIDE SERPL-SCNC: 103 MMOL/L (ref 98–107)
CO2 SERPL-SCNC: 19 MMOL/L (ref 22–29)
CREAT SERPL-MCNC: 0.58 MG/DL (ref 0.57–1)
DEPRECATED RDW RBC AUTO: 41.5 FL (ref 37–54)
EGFRCR SERPLBLD CKD-EPI 2021: 121.2 ML/MIN/1.73
EOSINOPHIL # BLD AUTO: 0.14 10*3/MM3 (ref 0–0.4)
EOSINOPHIL NFR BLD AUTO: 1.6 % (ref 0.3–6.2)
ERYTHROCYTE [DISTWIDTH] IN BLOOD BY AUTOMATED COUNT: 13.1 % (ref 12.3–15.4)
GLOBULIN UR ELPH-MCNC: 3.5 GM/DL
GLUCOSE SERPL-MCNC: 102 MG/DL (ref 65–99)
HCG SERPL QL: NEGATIVE
HCT VFR BLD AUTO: 43.1 % (ref 34–46.6)
HGB BLD-MCNC: 14.9 G/DL (ref 12–15.9)
IMM GRANULOCYTES # BLD AUTO: 0.02 10*3/MM3 (ref 0–0.05)
IMM GRANULOCYTES NFR BLD AUTO: 0.2 % (ref 0–0.5)
LYMPHOCYTES # BLD AUTO: 2.94 10*3/MM3 (ref 0.7–3.1)
LYMPHOCYTES NFR BLD AUTO: 33.2 % (ref 19.6–45.3)
MCH RBC QN AUTO: 30 PG (ref 26.6–33)
MCHC RBC AUTO-ENTMCNC: 34.6 G/DL (ref 31.5–35.7)
MCV RBC AUTO: 86.9 FL (ref 79–97)
MONOCYTES # BLD AUTO: 0.48 10*3/MM3 (ref 0.1–0.9)
MONOCYTES NFR BLD AUTO: 5.4 % (ref 5–12)
NEUTROPHILS NFR BLD AUTO: 5.24 10*3/MM3 (ref 1.7–7)
NEUTROPHILS NFR BLD AUTO: 59.1 % (ref 42.7–76)
NRBC BLD AUTO-RTO: 0 /100 WBC (ref 0–0.2)
PLATELET # BLD AUTO: 284 10*3/MM3 (ref 140–450)
PMV BLD AUTO: 9.2 FL (ref 6–12)
POTASSIUM SERPL-SCNC: 4.6 MMOL/L (ref 3.5–5.2)
PROT SERPL-MCNC: 7.8 G/DL (ref 6–8.5)
RBC # BLD AUTO: 4.96 10*6/MM3 (ref 3.77–5.28)
SODIUM SERPL-SCNC: 136 MMOL/L (ref 136–145)
WBC NRBC COR # BLD AUTO: 8.86 10*3/MM3 (ref 3.4–10.8)

## 2025-05-02 PROCEDURE — 99283 EMERGENCY DEPT VISIT LOW MDM: CPT

## 2025-05-02 PROCEDURE — 80053 COMPREHEN METABOLIC PANEL: CPT | Performed by: NURSE PRACTITIONER

## 2025-05-02 PROCEDURE — 96374 THER/PROPH/DIAG INJ IV PUSH: CPT

## 2025-05-02 PROCEDURE — 25010000002 DEXAMETHASONE PER 1 MG: Performed by: NURSE PRACTITIONER

## 2025-05-02 PROCEDURE — 84703 CHORIONIC GONADOTROPIN ASSAY: CPT | Performed by: NURSE PRACTITIONER

## 2025-05-02 PROCEDURE — 85025 COMPLETE CBC W/AUTO DIFF WBC: CPT | Performed by: NURSE PRACTITIONER

## 2025-05-02 RX ORDER — SODIUM CHLORIDE 0.9 % (FLUSH) 0.9 %
10 SYRINGE (ML) INJECTION AS NEEDED
Status: DISCONTINUED | OUTPATIENT
Start: 2025-05-02 | End: 2025-05-02 | Stop reason: HOSPADM

## 2025-05-02 RX ORDER — ORPHENADRINE CITRATE 30 MG/ML
60 INJECTION INTRAMUSCULAR; INTRAVENOUS ONCE
Status: DISCONTINUED | OUTPATIENT
Start: 2025-05-02 | End: 2025-05-02 | Stop reason: HOSPADM

## 2025-05-02 RX ORDER — DEXAMETHASONE SODIUM PHOSPHATE 10 MG/ML
10 INJECTION, SOLUTION INTRA-ARTICULAR; INTRALESIONAL; INTRAMUSCULAR; INTRAVENOUS; SOFT TISSUE ONCE
Status: COMPLETED | OUTPATIENT
Start: 2025-05-02 | End: 2025-05-02

## 2025-05-02 RX ADMIN — TIZANIDINE 4 MG: 4 TABLET ORAL at 10:46

## 2025-05-02 RX ADMIN — DEXAMETHASONE SODIUM PHOSPHATE 10 MG: 10 INJECTION INTRAMUSCULAR; INTRAVENOUS at 10:46

## 2025-05-02 NOTE — ED PROVIDER NOTES
Subjective   History of Present Illness  Patient is a 35-year-old female presents the emergency department with low back pain for the past week.  She denies any fall.  She states she is having pain down both of her legs as well.  She denies any weakness to the legs.  No incontinence of urine or stool.  No signs of saddle anesthesias.  She states she has not had pain in the back before.  She denies any known injury.  She also complains of restless leg syndrome.  She has been taking Requip without relief of symptoms.  She used to take clonazepam and gabapentin for her restless legs but her insurance ran out and she is needing these prescriptions refilled.  She states she is more worried about her back since she has never had back pain before.    History provided by:  Patient   used: No        Review of Systems   Constitutional: Negative.    HENT: Negative.     Eyes: Negative.    Respiratory: Negative.     Cardiovascular: Negative.    Gastrointestinal: Negative.    Endocrine: Negative.    Genitourinary: Negative.    Musculoskeletal:         Patient is a 35-year-old female presents the emergency department with low back pain for the past week.  She denies any fall.  She states she is having pain down both of her legs as well.  She denies any weakness to the legs.  No incontinence of urine or stool.  No signs of saddle anesthesias.  She states she has not had pain in the back before.  She denies any known injury.  She also complains of restless leg syndrome.  She has been taking Requip without relief of symptoms.  She used to take clonazepam and gabapentin for her restless legs but her insurance ran out and she is needing these prescriptions refilled.  She states she is more worried about her back since she has never had back pain before.     Skin: Negative.    Allergic/Immunologic: Negative.    Neurological: Negative.    Hematological: Negative.    Psychiatric/Behavioral: Negative.     All other  "systems reviewed and are negative.      Past Medical History:   Diagnosis Date    Anxiety     Bipolar 1 disorder     Bipolar 1 disorder     Depression        No Known Allergies    Past Surgical History:   Procedure Laterality Date     SECTION      x 3    LAPAROSCOPIC CHOLECYSTECTOMY         Family History   Problem Relation Age of Onset    Diabetes Paternal Grandfather     Cancer Maternal Grandmother     Cancer Maternal Aunt     Cancer Maternal Cousin     Breast cancer Neg Hx     Ovarian cancer Neg Hx     Uterine cancer Neg Hx     Colon cancer Neg Hx     Melanoma Neg Hx        Social History     Socioeconomic History    Marital status: Legally    Tobacco Use    Smoking status: Every Day     Current packs/day: 1.00     Average packs/day: 1 pack/day for 4.3 years (4.3 ttl pk-yrs)     Types: Cigarettes     Start date:     Smokeless tobacco: Never   Vaping Use    Vaping status: Never Used   Substance and Sexual Activity    Alcohol use: No    Drug use: Not Currently     Comment: THC 28 days ago, pt admits to using meth 3x a week    Sexual activity: Yes     Partners: Male     Birth control/protection: None       Prior to Admission medications    Medication Sig Start Date End Date Taking? Authorizing Provider   rOPINIRole (REQUIP) 0.5 MG tablet Take 1/2 tab PO nightly x 2 days then increase to 1 tab PO nightly; take 1 hour before bedtime. 25   Emerita Dwyer, APRN       /94   Pulse 106   Temp 98.3 °F (36.8 °C)   Resp 20   Ht 165.1 cm (65\")   Wt 108 kg (237 lb 8 oz)   SpO2 98%   BMI 39.52 kg/m²     Objective   Physical Exam  Vitals and nursing note reviewed.   Constitutional:       Appearance: She is well-developed.   HENT:      Head: Normocephalic and atraumatic.   Eyes:      Conjunctiva/sclera: Conjunctivae normal.      Pupils: Pupils are equal, round, and reactive to light.   Neck:      Thyroid: No thyromegaly.      Trachea: No tracheal deviation.   Cardiovascular:      Rate and " Rhythm: Normal rate and regular rhythm.      Heart sounds: Normal heart sounds.   Pulmonary:      Effort: Pulmonary effort is normal. No respiratory distress.      Breath sounds: Normal breath sounds. No wheezing or rales.   Chest:      Chest wall: No tenderness.   Abdominal:      General: Bowel sounds are normal.      Palpations: Abdomen is soft.   Musculoskeletal:      Cervical back: Normal range of motion and neck supple.      Comments: Tenderness on palpation of the paraspinal muscles of the lower lumbar spine bilaterally.  There is no step-off or laxity noted.  Foot push pull strong and equal.  DTRs are intact.   Skin:     General: Skin is warm and dry.   Neurological:      Mental Status: She is alert and oriented to person, place, and time.      Cranial Nerves: No cranial nerve deficit.      Deep Tendon Reflexes: Reflexes are normal and symmetric.   Psychiatric:         Behavior: Behavior normal.         Thought Content: Thought content normal.         Judgment: Judgment normal.         Procedures         Lab Results (last 24 hours)       Procedure Component Value Units Date/Time    CBC & Differential [[id number]]  (Normal) Collected: 05/02/25 1023    Specimen: Blood Updated: 05/02/25 1035    Narrative:      The following orders were created for panel order CBC & Differential.  Procedure                               Abnormality         Status                     ---------                               -----------         ------                     CBC Auto Differential[[id number]]        Normal              Final result                 Please view results for these tests on the individual orders.    Comprehensive Metabolic Panel [[id number]]  (Abnormal) Collected: 05/02/25 1023    Specimen: Blood Updated: 05/02/25 1114     Glucose 102 mg/dL      BUN 9 mg/dL      Creatinine 0.58 mg/dL      Sodium 136 mmol/L      Potassium 4.6 mmol/L      Comment: Slight hemolysis detected by analyzer. Result may be falsely  elevated.        Chloride 103 mmol/L      CO2 19.0 mmol/L      Calcium 9.3 mg/dL      Total Protein 7.8 g/dL      Albumin 4.3 g/dL      ALT (SGPT) 21 U/L      AST (SGOT) 22 U/L      Comment: Slight hemolysis detected by analyzer. Result may be falsely elevated.        Alkaline Phosphatase 61 U/L      Total Bilirubin 0.2 mg/dL      Globulin 3.5 gm/dL      A/G Ratio 1.2 g/dL      BUN/Creatinine Ratio 15.5     Anion Gap 14.0 mmol/L      eGFR 121.2 mL/min/1.73     Narrative:      GFR Categories in Chronic Kidney Disease (CKD)              GFR Category          GFR (mL/min/1.73)    Interpretation  G1                    90 or greater        Normal or high (1)  G2                    60-89                Mild decrease (1)  G3a                   45-59                Mild to moderate decrease  G3b                   30-44                Moderate to severe decrease  G4                    15-29                Severe decrease  G5                    14 or less           Kidney failure    (1)In the absence of evidence of kidney disease, neither GFR category G1 or G2 fulfill the criteria for CKD.    eGFR calculation 2021 CKD-EPI creatinine equation, which does not include race as a factor    hCG, Serum, Qualitative [[id number]]  (Normal) Collected: 05/02/25 1023    Specimen: Blood Updated: 05/02/25 1051     HCG Qualitative Negative    CBC Auto Differential [[id number]]  (Normal) Collected: 05/02/25 1023    Specimen: Blood Updated: 05/02/25 1035     WBC 8.86 10*3/mm3      RBC 4.96 10*6/mm3      Hemoglobin 14.9 g/dL      Hematocrit 43.1 %      MCV 86.9 fL      MCH 30.0 pg      MCHC 34.6 g/dL      RDW 13.1 %      RDW-SD 41.5 fl      MPV 9.2 fL      Platelets 284 10*3/mm3      Neutrophil % 59.1 %      Lymphocyte % 33.2 %      Monocyte % 5.4 %      Eosinophil % 1.6 %      Basophil % 0.5 %      Immature Grans % 0.2 %      Neutrophils, Absolute 5.24 10*3/mm3      Lymphocytes, Absolute 2.94 10*3/mm3      Monocytes, Absolute 0.48 10*3/mm3       Eosinophils, Absolute 0.14 10*3/mm3      Basophils, Absolute 0.04 10*3/mm3      Immature Grans, Absolute 0.02 10*3/mm3      nRBC 0.0 /100 WBC             No orders to display       ED Course  ED Course as of 05/02/25 1845   Fri May 02, 2025   1058 Patient states that she wants no further workup.  She states she is wanting to leave at this time.  She states she is not wanting her CAT scan.  Advised patient she would have to sign out AGAINST MEDICAL ADVICE.  She is alert and oriented and able to make her own decisions.  Advised risk of leaving not knowing the results of her test could lead to permanent disability and even death.  Patient states she wishes to leave.  AMA papers will be signed. [CW]      ED Course User Index  [CW] Dorothy Bryson APRN        Medical Decision Making  Patient is a 35-year-old female presents the emergency department with low back pain for the past week.  She denies any fall.  She states she is having pain down both of her legs as well.  She denies any weakness to the legs.  No incontinence of urine or stool.  No signs of saddle anesthesias.  She states she has not had pain in the back before.  She denies any known injury.  She also complains of restless leg syndrome.  She has been taking Requip without relief of symptoms.  She used to take clonazepam and gabapentin for her restless legs but her insurance ran out and she is needing these prescriptions refilled.  She states she is more worried about her back since she has never had back pain before.  Course of treatment in the ED: Nontoxic-appearing.  No acute distress.  Lungs clear to auscultation.  CV normal sinus rhythm.  Vitals are stable blood pressure 157/94, C of 98.3, heart rate 106, respirations 20, O2 sat 98% on room air.  Back assessment has tenderness on palpation paraspinal muscles of the lower lumbar spine bilaterally.  There is no step-off laxity noted.  DTRs are intact.  Ambulates without difficulty.  Laboratory  studies urinalysis CT of the lumbar spine has been ordered.  Decadron and Norflex have been ordered.  Differential diagnosis to include but not limited to: disc herniation; ddd; lumbar strain; lumbar fracture; and other     Patient states that she wants no further workup.  She states she is wanting to leave at this time.  She states she is not wanting her CAT scan.  Advised patient she would have to sign out AGAINST MEDICAL ADVICE.  She is alert and oriented and able to make her own decisions.  Advised risk of leaving not knowing the results of her test could lead to permanent disability and even death.  Patient states she wishes to leave.  AMA papers will be signed.      Problems Addressed:  Acute bilateral back pain, unspecified back location: complicated acute illness or injury    Amount and/or Complexity of Data Reviewed  Labs: ordered.  Radiology: ordered.    Risk  Prescription drug management.         Final diagnoses:   Acute bilateral back pain, unspecified back location          Dorothy Bryson, APRN  05/02/25 4746

## 2025-05-03 ENCOUNTER — NURSE TRIAGE (OUTPATIENT)
Dept: CALL CENTER | Facility: HOSPITAL | Age: 35
End: 2025-05-03
Payer: COMMERCIAL

## 2025-05-03 NOTE — TELEPHONE ENCOUNTER
"Test results back In My Chart    Caller does not understand test results and is requesting explanation.  Advised to follow up with PCP when office opens.  Reason for Disposition   Caller requesting lab results  (Exception: Routine or non-urgent lab result.)    Additional Information   Negative: Lab calling with strep throat test results and triager can call in prescription   Negative: Lab calling with urinalysis test results and triager can call in prescription   Negative: Medication questions   Negative: Medication renewal and refill questions   Negative: Pre-operative or pre-procedural questions   Negative: ED call to PCP (i.e., primary care provider; doctor, NP, or PA)   Negative: Doctor (or NP/PA) call to PCP   Negative: Call about patient who is currently hospitalized   Negative: Lab or radiology calling with CRITICAL test results   Negative: [1] Follow-up call from patient regarding patient's clinical status AND [2] information urgent   Negative: [1] Caller requests to speak ONLY to PCP AND [2] URGENT question   Negative: [1] Caller requests to speak to PCP now AND [2] won't tell us reason for call  (Exception: If 10 pm to 6 am, caller must first discuss reason for the call.)   Negative: Notification of hospital admission   Negative: Notification of death    Answer Assessment - Initial Assessment Questions  1. REASON FOR CALL or QUESTION: \"What is your reason for calling today?\" or \"How can I best  help you?\" or \"What question do you have that I can help answer?\"   Test results posted in My Chart Callers asking for explanation of results. Advised to follow up with PCP when office opens  2. CALLER: Document the source of call. (e.g., laboratory, patient).      patient    Protocols used: PCP Call - No Triage-ADULT-    "

## 2025-05-05 ENCOUNTER — TELEPHONE (OUTPATIENT)
Dept: FAMILY MEDICINE CLINIC | Facility: CLINIC | Age: 35
End: 2025-05-05

## 2025-05-05 ENCOUNTER — OFFICE VISIT (OUTPATIENT)
Dept: PRIMARY CARE CLINIC | Age: 35
End: 2025-05-05
Payer: COMMERCIAL

## 2025-05-05 VITALS
TEMPERATURE: 97.3 F | SYSTOLIC BLOOD PRESSURE: 122 MMHG | HEIGHT: 65 IN | BODY MASS INDEX: 39.65 KG/M2 | DIASTOLIC BLOOD PRESSURE: 80 MMHG | WEIGHT: 238 LBS | HEART RATE: 99 BPM | OXYGEN SATURATION: 97 %

## 2025-05-05 DIAGNOSIS — Z76.89 ENCOUNTER TO ESTABLISH CARE: ICD-10-CM

## 2025-05-05 DIAGNOSIS — G25.81 RLS (RESTLESS LEGS SYNDROME): ICD-10-CM

## 2025-05-05 PROCEDURE — 99203 OFFICE O/P NEW LOW 30 MIN: CPT | Performed by: NURSE PRACTITIONER

## 2025-05-05 RX ORDER — ROPINIROLE 0.5 MG/1
0.5 TABLET, FILM COATED ORAL
COMMUNITY
Start: 2025-04-01

## 2025-05-05 RX ORDER — DEXTROAMPHETAMINE SACCHARATE, AMPHETAMINE ASPARTATE, DEXTROAMPHETAMINE SULFATE AND AMPHETAMINE SULFATE 7.5; 7.5; 7.5; 7.5 MG/1; MG/1; MG/1; MG/1
37.5 TABLET ORAL DAILY
COMMUNITY

## 2025-05-05 RX ORDER — BUPRENORPHINE AND NALOXONE 8; 2 MG/1; MG/1
1 FILM, SOLUBLE BUCCAL; SUBLINGUAL DAILY
COMMUNITY

## 2025-05-05 SDOH — ECONOMIC STABILITY: FOOD INSECURITY: WITHIN THE PAST 12 MONTHS, YOU WORRIED THAT YOUR FOOD WOULD RUN OUT BEFORE YOU GOT MONEY TO BUY MORE.: NEVER TRUE

## 2025-05-05 SDOH — ECONOMIC STABILITY: FOOD INSECURITY: WITHIN THE PAST 12 MONTHS, THE FOOD YOU BOUGHT JUST DIDN'T LAST AND YOU DIDN'T HAVE MONEY TO GET MORE.: NEVER TRUE

## 2025-05-05 ASSESSMENT — PATIENT HEALTH QUESTIONNAIRE - PHQ9
SUM OF ALL RESPONSES TO PHQ QUESTIONS 1-9: 0
SUM OF ALL RESPONSES TO PHQ QUESTIONS 1-9: 0
1. LITTLE INTEREST OR PLEASURE IN DOING THINGS: NOT AT ALL
SUM OF ALL RESPONSES TO PHQ QUESTIONS 1-9: 0
2. FEELING DOWN, DEPRESSED OR HOPELESS: NOT AT ALL
SUM OF ALL RESPONSES TO PHQ QUESTIONS 1-9: 0

## 2025-05-05 NOTE — PROGRESS NOTES
Ms.Jeri Bay is a 35 y.o. female who presents today for  Chief Complaint   Patient presents with    New Patient       HPI:  History of Present Illness  The patient presents to [facility].  Previously followed by [facility].    She has been under the care of a Suboxone clinic for approximately 2 years, following a history of opioid abuse. She reports abstaining from drug use and has successfully completed all rehabilitation programs. She is not currently under psychiatric care, having graduated from all required programs. Previously, she was prescribed Adderall by her psychiatrist.  She does not feel the need to continue psychiatric visits due to time constraints    She was prescribed ropinirole for restless legs syndrome, which she took as directed but found it ineffective.  Current dose dose is 0.5 mg.  Despite taking the medication, she continued to experience restlessness in her legs with frequent jerking interfering with sleep.  She states she has tried other medications including Lyrica and gabapentin.  She states Lyrica was \"too strong\" but gabapentin was effective.  She states she had a sleep study in the remote past as a teenager.        Results      Review of Systems  Patient left before ROS    Patient left before physical exam could be completed.    Past Medical History:   Diagnosis Date    Bipolar 1 disorder (HCC)     Gestational hypertension     Hepatitis C        Current Outpatient Medications   Medication Sig Dispense Refill    amphetamine-dextroamphetamine (ADDERALL) 30 MG tablet Take 37.5 mg by mouth daily. Max Daily Amount: 37.5 mg      buprenorphine-naloxone (SUBOXONE) 8-2 MG FILM SL film Place 1 Film under the tongue daily. Max Daily Amount: 1 Film      rOPINIRole (REQUIP) 0.5 MG tablet Take 1 tablet by mouth      ibuprofen (ADVIL;MOTRIN) 800 MG tablet Take 1 tablet by mouth 2 times daily as needed for Pain 60 tablet 0     No current facility-administered medications for this

## 2025-05-05 NOTE — TELEPHONE ENCOUNTER
Pt called upset because no one has responded to her messages or calls from 04/30, I let her know that provider had been  out of office but that I would speak with her. She states she is a smoker and the ropinirole is for parkinson's and it states clearly on the bottle that if you are a smoker that medication will not work, she states she is a smoker and no one asked her about smoking.     She went on to ask about Dr. Fontaine and why she was unable to see him. She states that he is her mother's provider and that he told her mother that he would see her as a new pt. I explained that his panel was closed and that he was not taking new pts. She was insistent that he would see her and she wanted to know how to speak with him. I informed her that he was not her provider and she hung up.     She was due for a FU appt which she declined.

## 2025-05-05 NOTE — TELEPHONE ENCOUNTER
Caller: Emmie Noonan    Relationship: Self    Best call back number: [phone]     What is the best time to reach you: ANY    Who are you requesting to speak with (clinical staff, provider,  specific staff member): NONE SPECIFIED      What was the call regarding:     PATIENT IS WONDERING IF ANYONE FROM THE OFFICE COULD REVIEW HER TEST RESULTS FROM THE ER WITH HER.     Is it okay if the provider responds through [employer]: PLEASE CALL

## 2025-05-06 ENCOUNTER — TELEPHONE (OUTPATIENT)
Dept: OBSTETRICS AND GYNECOLOGY | Age: 35
End: 2025-05-06

## 2025-05-06 NOTE — TELEPHONE ENCOUNTER
"MISSY JOLLY    [phone]    [facility]     \" So I've came in there and done a few iui that was not successful me and my  is currently still trying so I was wondering if there was any way Dr Thurston could give me like a fertility medicine like clomid or something like that \"    OR WANTED TO KNOW IF DR EARLY WOULD DO ANY IUI W/ HER   "

## 2025-05-20 ENCOUNTER — TELEPHONE (OUTPATIENT)
Dept: OBSTETRICS AND GYNECOLOGY | Age: 35
End: 2025-05-20

## 2025-05-20 NOTE — TELEPHONE ENCOUNTER
Caller: MISSY JOLLY    Best call back number: [phone]    PATIENT CALLED REQUESTING TO CANCEL SAME DAY APPT.    Did the patient call AFTER the start time of their scheduled appointment?  []YES  [x]NO    Was the patient's appointment rescheduled? [x]YES  []NO

## 2025-06-04 ENCOUNTER — TELEPHONE (OUTPATIENT)
Age: 35
End: 2025-06-04
Payer: COMMERCIAL

## 2025-06-04 ENCOUNTER — TELEMEDICINE (OUTPATIENT)
Age: 35
End: 2025-06-04
Payer: COMMERCIAL

## 2025-06-04 DIAGNOSIS — Z31.9 INFERTILITY MANAGEMENT: Primary | ICD-10-CM

## 2025-06-04 DIAGNOSIS — F17.200 SMOKER: ICD-10-CM

## 2025-06-04 RX ORDER — CLOMIPHENE CITRATE 50 MG/1
50 TABLET ORAL DAILY
Qty: 5 TABLET | Refills: 0 | Status: SHIPPED | OUTPATIENT
Start: 2025-06-04

## 2025-06-04 NOTE — TELEPHONE ENCOUNTER
Called and spoke to pt and she informed me that she did not want to do Clomid again and wanted to know if there were any other options for her to try instead of this.  Just let me know how to proceed and I'm happy to let her know.

## 2025-06-04 NOTE — PROGRESS NOTES
"Chief Complaint  No chief complaint on file.    Infertility    Subjective           Emmie Noonan presents to [facility]  History of Present Illness    Patient presents today for follow-up  She desires a trial of clomiphene  We discussed her AMH level being low and indicative of possible ovarian factor  She does appear to be ovulating on her own  We discussed the possibility of overstimulation and multiple follicle development  I do not think that artificial insemination is reasonable given her AMH  She does not wish to try for clomiphene    Objective   Vital Signs:   There were no vitals taken for this visit.    Estimated body mass index is 39.52 kg/m² as calculated from the following:    Height as of 5/2/25: 165.1 cm (65\").    Weight as of 5/2/25: 108 kg (237 lb 8 oz).     Physical Exam   Constitutional: She appears well-developed and well-nourished.   HENT:   Head: Normocephalic and atraumatic.   Neurological: She is alert.   Psychiatric: She has a normal mood and affect.     Result Review :                   Assessment and Plan      Diagnoses and all orders for this visit:    1. Infertility management (Primary)  -     clomiPHENE (CLOMID) 50 MG tablet; Take 1 tablet by mouth Daily.  Dispense: 5 tablet; Refill: 0    2. Smoker        Follow Up     Return for will call to schedule follicle u/s on CD 10.  Patient was given instructions and counseling regarding her condition or for health maintenance advice. Please see specific information pulled into the AVS if appropriate.     Mode of Visit: Video  Location of patient: home  [place] of [place] [facility] [place] [facility]  You have chosen to receive care through a telehealth visit.  The patient has signed the video visit consent form.  The visit included audio and video interaction. No technical issues occurred during this visit.    Will try 1 month of Clomid with ultrasound surveillance  Timed intercourse  Call for concerns or questions    Armen NAM" MD Sarah

## 2025-06-06 NOTE — TELEPHONE ENCOUNTER
Called and informed pt that Dr. Smith feels like this is the best medication for her and feels like her ovaries are the reason why she is experiencing infertility, however, the only way to correct his is to see an infertility specialist and she can pursue this at her convenience, if she wishes.  Pt voiced understanding.

## 2025-06-16 ENCOUNTER — TELEPHONE (OUTPATIENT)
Dept: OBSTETRICS AND GYNECOLOGY | Age: 35
End: 2025-06-16
Payer: COMMERCIAL

## 2025-06-16 NOTE — TELEPHONE ENCOUNTER
Called and informed pt Dr. Smith reviewed her u/s and wants to repeat u/s on Friday, pt voiced understanding.  Scheduled for 0830 per pt request.

## 2025-06-20 ENCOUNTER — TELEPHONE (OUTPATIENT)
Age: 35
End: 2025-06-20

## 2025-06-20 NOTE — TELEPHONE ENCOUNTER
Caller: Emmie Noonan    Relationship to patient: Self    Best call back number: [phone]    Chief complaint: NEEDS TO R/S HER CONCEPTION U/S THAT DEPENDS ON THE TIMING    Type of visit: U/S    Requested date:      If rescheduling, when is the original appointment: 06-20     Additional notes: THE FRONT OFFICE STATED TO SEND TO CLINICAL TO ADVISE ON THE TIMING OF THIS--SEE ROBERT

## (undated) DEVICE — BINDER ABD H12IN COT FOR 45-62IN WAIST UNIV PREM 4 PNL DSGN

## (undated) DEVICE — SPONGE LAP W18XL18IN WHT COT 4 PLY FLD STRUNG RADPQ DISP ST

## (undated) DEVICE — TRAY,FOLEY,100% SIL,16FR 10ML: Brand: MEDLINE

## (undated) DEVICE — STERILE POLYISOPRENE POWDER-FREE SURGICAL GLOVES: Brand: PROTEXIS

## (undated) DEVICE — DRESSING FOAM W10XL20CM ANTIMIC SELF ADH SAFETAC TECHNOLOGY

## (undated) DEVICE — SUTURE MCRYL SZ 3-0 L27IN ABSRB UD L60MM KS STR REV CUT Y523H

## (undated) DEVICE — BARRIER ADH W3XL4IN UTER PELV ABSRB GYNECARE INTCEED

## (undated) DEVICE — SUTURE STRATAFIX SYMMETRIC PDS + SZ 1 L18IN ABSRB VLT OS-6 SXPP1A201

## (undated) DEVICE — SUTURE VCRL SZ 0 L36IN ABSRB VLT L36MM CT-1 1/2 CIR J346H

## (undated) DEVICE — GLOVE ORANGE PI 7 1/2   MSG9075

## (undated) DEVICE — SUTURE VCRL + SZ 2-0 L36IN ABSRB UD L36MM CT-1 1/2 CIR VCP945H

## (undated) DEVICE — TRAY EPI 25GA L3.5IN 0.75% BIPIVCAIN 8.25% D CONTAIN BPA

## (undated) DEVICE — GARMENT COMPR STD FOR 17IN CALF UNIF THER FLOTRN

## (undated) DEVICE — SYSTEM SKIN CLSR 22CM DERMBND PRINEO

## (undated) DEVICE — SOLUTION IV IRRIG POUR BRL 0.9% SODIUM CHL 2F7124

## (undated) DEVICE — Device

## (undated) DEVICE — MASK ROUND 60MM FPH (10) S/USE: Brand: FISHER & PAYKEL HEALTHCARE

## (undated) DEVICE — AIR SHEET,LAT,COMFORT GLIDE, BLEND 40X80: Brand: MEDLINE